# Patient Record
Sex: MALE | Race: WHITE | Employment: UNEMPLOYED | URBAN - METROPOLITAN AREA
[De-identification: names, ages, dates, MRNs, and addresses within clinical notes are randomized per-mention and may not be internally consistent; named-entity substitution may affect disease eponyms.]

---

## 2017-06-25 ENCOUNTER — APPOINTMENT (OUTPATIENT)
Dept: GENERAL RADIOLOGY | Facility: HOSPITAL | Age: 47
DRG: 377 | End: 2017-06-25
Attending: EMERGENCY MEDICINE
Payer: MEDICAID

## 2017-06-25 ENCOUNTER — HOSPITAL ENCOUNTER (INPATIENT)
Facility: HOSPITAL | Age: 47
LOS: 3 days | Discharge: HOME | DRG: 377 | End: 2017-06-28
Attending: EMERGENCY MEDICINE | Admitting: EMERGENCY MEDICINE
Payer: MEDICAID

## 2017-06-25 DIAGNOSIS — K92.2 UPPER GI BLEED: Primary | ICD-10-CM

## 2017-06-25 DIAGNOSIS — K29.00 ACUTE GASTRITIS WITHOUT HEMORRHAGE, UNSPECIFIED GASTRITIS TYPE: ICD-10-CM

## 2017-06-25 DIAGNOSIS — E10.10 TYPE 1 DIABETES MELLITUS WITH KETOACIDOSIS WITHOUT COMA (HCC): ICD-10-CM

## 2017-06-25 DIAGNOSIS — K22.10 EROSIVE ESOPHAGITIS: ICD-10-CM

## 2017-06-25 LAB
ALBUMIN SERPL BCP-MCNC: 4.9 G/DL (ref 3.5–4.8)
ALP SERPL-CCNC: 78 U/L (ref 32–100)
ALT SERPL-CCNC: 40 U/L (ref 17–63)
ANION GAP SERPL CALC-SCNC: 10 MMOL/L (ref 0–18)
ANION GAP SERPL CALC-SCNC: 31 MMOL/L (ref 0–18)
ANTIBODY SCREEN: NEGATIVE
APTT PPP: 25.8 SECONDS (ref 23.2–35.3)
AST SERPL-CCNC: 24 U/L (ref 15–41)
BASE EXCESS BLD CALC-SCNC: -13.8 MMOL/L (ref ?–2)
BASOPHILS # BLD: 0 K/UL (ref 0–0.2)
BASOPHILS NFR BLD: 0 %
BILIRUB DIRECT SERPL-MCNC: 0.2 MG/DL (ref 0–0.2)
BILIRUB SERPL-MCNC: 2 MG/DL (ref 0.3–1.2)
BUN SERPL-MCNC: 24 MG/DL (ref 8–20)
BUN SERPL-MCNC: 34 MG/DL (ref 8–20)
BUN/CREAT SERPL: 16 (ref 10–20)
BUN/CREAT SERPL: 20.2 (ref 10–20)
CALCIUM SERPL-MCNC: 10.6 MG/DL (ref 8.5–10.5)
CALCIUM SERPL-MCNC: 9.7 MG/DL (ref 8.5–10.5)
CHLORIDE SERPL-SCNC: 111 MMOL/L (ref 95–110)
CHLORIDE SERPL-SCNC: 94 MMOL/L (ref 95–110)
CO2 SERPL-SCNC: 15 MMOL/L (ref 22–32)
CO2 SERPL-SCNC: 24 MMOL/L (ref 22–32)
CREAT SERPL-MCNC: 1.19 MG/DL (ref 0.5–1.5)
CREAT SERPL-MCNC: 2.13 MG/DL (ref 0.5–1.5)
EOSINOPHIL # BLD: 0 K/UL (ref 0–0.7)
EOSINOPHIL NFR BLD: 0 %
ERYTHROCYTE [DISTWIDTH] IN BLOOD BY AUTOMATED COUNT: 13.1 % (ref 11–15)
ERYTHROCYTE [DISTWIDTH] IN BLOOD BY AUTOMATED COUNT: 13.3 % (ref 11–15)
GLUCOSE BLDC GLUCOMTR-MCNC: 107 MG/DL (ref 70–99)
GLUCOSE BLDC GLUCOMTR-MCNC: 116 MG/DL (ref 70–99)
GLUCOSE BLDC GLUCOMTR-MCNC: 117 MG/DL (ref 70–99)
GLUCOSE BLDC GLUCOMTR-MCNC: 126 MG/DL (ref 70–99)
GLUCOSE BLDC GLUCOMTR-MCNC: 143 MG/DL (ref 70–99)
GLUCOSE BLDC GLUCOMTR-MCNC: 145 MG/DL (ref 70–99)
GLUCOSE BLDC GLUCOMTR-MCNC: 180 MG/DL (ref 70–99)
GLUCOSE BLDC GLUCOMTR-MCNC: 213 MG/DL (ref 70–99)
GLUCOSE BLDC GLUCOMTR-MCNC: 235 MG/DL (ref 70–99)
GLUCOSE BLDC GLUCOMTR-MCNC: 237 MG/DL (ref 70–99)
GLUCOSE BLDC GLUCOMTR-MCNC: 242 MG/DL (ref 70–99)
GLUCOSE BLDC GLUCOMTR-MCNC: 247 MG/DL (ref 70–99)
GLUCOSE BLDC GLUCOMTR-MCNC: 309 MG/DL (ref 70–99)
GLUCOSE BLDC GLUCOMTR-MCNC: 371 MG/DL (ref 70–99)
GLUCOSE BLDC GLUCOMTR-MCNC: 443 MG/DL (ref 70–99)
GLUCOSE BLDC GLUCOMTR-MCNC: 80 MG/DL (ref 70–99)
GLUCOSE BLDC GLUCOMTR-MCNC: 87 MG/DL (ref 70–99)
GLUCOSE BLDC GLUCOMTR-MCNC: 98 MG/DL (ref 70–99)
GLUCOSE BLDC GLUCOMTR-MCNC: 99 MG/DL (ref 70–99)
GLUCOSE BLDC GLUCOMTR-MCNC: >500 MG/DL (ref 70–99)
GLUCOSE SERPL-MCNC: 145 MG/DL (ref 70–99)
GLUCOSE SERPL-MCNC: 554 MG/DL (ref 70–99)
HBA1C MFR BLD: 9.9 % (ref 4–6)
HCO3 BLDA-SCNC: 12.5 MEQ/L (ref 21–27)
HCT VFR BLD AUTO: 46.3 % (ref 41–52)
HCT VFR BLD AUTO: 49 % (ref 41–52)
HGB BLD-MCNC: 15.2 G/DL (ref 13.5–17.5)
HGB BLD-MCNC: 15.8 G/DL (ref 13.5–17.5)
INR BLD: 1 (ref 0.9–1.2)
LIPASE SERPL-CCNC: 12 U/L (ref 22–51)
LYMPHOCYTES # BLD: 1.2 K/UL (ref 1–4)
LYMPHOCYTES NFR BLD: 7 %
MCH RBC QN AUTO: 31.8 PG (ref 27–32)
MCH RBC QN AUTO: 32.5 PG (ref 27–32)
MCHC RBC AUTO-ENTMCNC: 32.3 G/DL (ref 32–37)
MCHC RBC AUTO-ENTMCNC: 32.8 G/DL (ref 32–37)
MCV RBC AUTO: 100.5 FL (ref 80–100)
MCV RBC AUTO: 97 FL (ref 80–100)
MONOCYTES # BLD: 1 K/UL (ref 0–1)
MONOCYTES NFR BLD: 5 %
MRSA DNA SPEC QL NAA+PROBE: NEGATIVE
NEUTROPHILS # BLD AUTO: 16.4 K/UL (ref 1.8–7.7)
NEUTROPHILS NFR BLD: 88 %
O2 CT BLD-SCNC: 18.6 VOL% (ref 15–23)
OSMOLALITY UR CALC.SUM OF ELEC: 307 MOSM/KG (ref 275–295)
OSMOLALITY UR CALC.SUM OF ELEC: 323 MOSM/KG (ref 275–295)
PCO2 BLDA: 31 MM HG (ref 35–45)
PH BLDA: 7.23 [PH] (ref 7.35–7.45)
PHOSPHATE SERPL-MCNC: 1.3 MG/DL (ref 2.4–4.7)
PLATELET # BLD AUTO: 239 K/UL (ref 140–400)
PLATELET # BLD AUTO: 250 K/UL (ref 140–400)
PMV BLD AUTO: 10.4 FL (ref 7.4–10.3)
PMV BLD AUTO: 9.7 FL (ref 7.4–10.3)
PO2 BLDA: 99 MM HG (ref 80–100)
PO2 SATN ADJ TO 0.5 BLD: 32 MMHG (ref 24–28)
POTASSIUM SERPL-SCNC: 3.9 MMOL/L (ref 3.3–5.1)
POTASSIUM SERPL-SCNC: 4.8 MMOL/L (ref 3.3–5.1)
PROT SERPL-MCNC: 8.4 G/DL (ref 5.9–8.4)
PROTHROMBIN TIME: 12.4 SECONDS (ref 11.8–14.5)
PUNCTURE CHARGE: YES
RBC # BLD AUTO: 4.77 M/UL (ref 4.5–5.9)
RBC # BLD AUTO: 4.88 M/UL (ref 4.5–5.9)
RH BLOOD TYPE: POSITIVE
SAO2 % BLDA: 96.3 % (ref 94–100)
SODIUM SERPL-SCNC: 140 MMOL/L (ref 136–144)
SODIUM SERPL-SCNC: 145 MMOL/L (ref 136–144)
WBC # BLD AUTO: 18.6 K/UL (ref 4–11)
WBC # BLD AUTO: 23.9 K/UL (ref 4–11)

## 2017-06-25 PROCEDURE — 74020 XR ABDOMEN, OBSTRUCTIVE SERIES (CPT=74020): CPT | Performed by: EMERGENCY MEDICINE

## 2017-06-25 PROCEDURE — 99223 1ST HOSP IP/OBS HIGH 75: CPT | Performed by: INTERNAL MEDICINE

## 2017-06-25 PROCEDURE — 99291 CRITICAL CARE FIRST HOUR: CPT | Performed by: INTERNAL MEDICINE

## 2017-06-25 RX ORDER — ONDANSETRON 2 MG/ML
INJECTION INTRAMUSCULAR; INTRAVENOUS
Status: COMPLETED
Start: 2017-06-25 | End: 2017-06-25

## 2017-06-25 RX ORDER — ONDANSETRON 2 MG/ML
4 INJECTION INTRAMUSCULAR; INTRAVENOUS EVERY 6 HOURS PRN
Status: DISCONTINUED | OUTPATIENT
Start: 2017-06-25 | End: 2017-06-28

## 2017-06-25 RX ORDER — SODIUM CHLORIDE 9 MG/ML
INJECTION, SOLUTION INTRAVENOUS CONTINUOUS
Status: DISCONTINUED | OUTPATIENT
Start: 2017-06-25 | End: 2017-06-27

## 2017-06-25 RX ORDER — FAMOTIDINE 20 MG/1
20 TABLET ORAL 2 TIMES DAILY PRN
Status: ON HOLD | COMMUNITY
End: 2017-06-28

## 2017-06-25 RX ORDER — LIDOCAINE HYDROCHLORIDE 20 MG/ML
20 JELLY TOPICAL ONCE
Status: COMPLETED | OUTPATIENT
Start: 2017-06-25 | End: 2017-06-25

## 2017-06-25 RX ORDER — DEXTROSE AND SODIUM CHLORIDE 5; .9 G/100ML; G/100ML
INJECTION, SOLUTION INTRAVENOUS
Status: COMPLETED
Start: 2017-06-25 | End: 2017-06-25

## 2017-06-25 RX ORDER — INSULIN LISPRO 100 [IU]/ML
INJECTION, SOLUTION INTRAVENOUS; SUBCUTANEOUS
Status: ON HOLD | COMMUNITY
End: 2021-08-18

## 2017-06-25 NOTE — CONSULTS
[facility]    Report of Endocrinology Consultation  [facility]    Remigio Whitt Patient Status:  Inpatient    1970 MRN [id number]   Location [facility] 2W/SW Attending Fran Vicente MD   Hosp Day # 0 PCP No CLAUDE, EOMI,   Neck: Supple. Lungs: Clear bilaterally. Cardiac: Regular rate and rhythm. Abdomen: Bowel sounds present, normoactive. Nontender. Extremities:  No lower extremity edema noted, DP +2 b/l  Skin: Normal texture and turgor.   Lymphatic:  No

## 2017-06-25 NOTE — CONSULTS
[facility] AND CLINICS  Report of Consultation    Thai Workman Patient Status:  Inpatient    1970 MRN [id number]   Location [facility] 2W/ Attending Marvin Waller MD   Hosp Day # 0 PCP None Pcp     Reason for Consultation:  Hematemesis (36.7 °C), temperature source Temporal, resp. rate 19, height 5' 11\" (1.803 m), weight 160 lb (72.6 kg), SpO2 96 %. General: Appears alert, oriented x3 and in no acute distress. HEENT: Normal. No neck vein distention. Thyroid not enlarged.   No lymphad Tanner  6/25/2017  1:03 PM

## 2017-06-25 NOTE — ED INITIAL ASSESSMENT (HPI)
Amy Fernando arrives to ED via [facility]NES c/o mid abd pain and n/v x24 hrs. Coffee ground emesis.

## 2017-06-25 NOTE — ED NOTES
Pt is back from x-ray via cart, pt is still c/o abd pain and nausea. Pt is awake and alert, states he feels a little better.

## 2017-06-25 NOTE — ED PROVIDER NOTES
Patient Seen in: [facility] AND [facility] Emergency Department    History   Patient presents with:  GI Bleeding (gastrointestinal)    Stated Complaint: vomiting blood    HPI    40year old male with a past medical history of insulin-dependent diabetes who prese well-developed and well-nourished. He appears distressed (The patient is sitting up, leaning over an emesis basin, intermittently dry heaving and spitting up coffee-ground emesis.). HENT:   Head: Normocephalic and atraumatic.    Eyes: Conjunctivae and EOM 12.5 (*)     Blood Gas Base Excess -13.8 (*)     Blood Gas P-50 32 (*)     All other components within normal limits   POCT GLUCOSE - Abnormal; Notable for the following:     POC Glucose  >500 (*)     All other components within normal limits   CBC W/ DIFF Course  ------------------------------------------------------------  [facility]     Pulse Ox: 98%, Normal, RA    Cardiac Monitor: Pulse Readings from Last 1 Encounters:  06/25/17 : 104  , sinus, normal for rhythm, abnormal for rate    Medications   Insulin Regul

## 2017-06-25 NOTE — H&P
[facility]    History & Physical    Date:  6/25/2017   Date of Admission:  6/25/2017      Chief Complaint:   Thai Melena is a(n) 40year old male with hematemesis and hyperglycemia.     HPI:   The patient has a history of type 1 diabetes examination is unremarkable with pupils equal round and reactive to light and accommodation. Neck without adenopathy, thyromegaly, JVD nor bruit. Lungs clear to auscultation and percussion. Cardiac regular rate and rhythm no murmur.    Abdomen minimal

## 2017-06-25 NOTE — ED NOTES
Huy Garza presents to the ER c/o vomiting x 24 hours. Pt is alert oriented from home. Copious dark, brown vomitus present. Pt states he's unsure how long he's been vomiting blood. IV 20# to left ac started by EMS. IV 14# started here in the ER.

## 2017-06-26 LAB
ALBUMIN SERPL BCP-MCNC: 3.2 G/DL (ref 3.5–4.8)
ALBUMIN/GLOB SERPL: 1.4 {RATIO} (ref 1–2)
ALP SERPL-CCNC: 46 U/L (ref 32–100)
ALT SERPL-CCNC: 22 U/L (ref 17–63)
ANION GAP SERPL CALC-SCNC: 5 MMOL/L (ref 0–18)
AST SERPL-CCNC: 17 U/L (ref 15–41)
BASOPHILS # BLD: 0 K/UL (ref 0–0.2)
BASOPHILS NFR BLD: 0 %
BILIRUB SERPL-MCNC: 1.4 MG/DL (ref 0.3–1.2)
BUN SERPL-MCNC: 11 MG/DL (ref 8–20)
BUN/CREAT SERPL: 14.7 (ref 10–20)
CALCIUM SERPL-MCNC: 8.1 MG/DL (ref 8.5–10.5)
CHLORIDE SERPL-SCNC: 107 MMOL/L (ref 95–110)
CO2 SERPL-SCNC: 26 MMOL/L (ref 22–32)
CREAT SERPL-MCNC: 0.75 MG/DL (ref 0.5–1.5)
EOSINOPHIL # BLD: 0 K/UL (ref 0–0.7)
EOSINOPHIL NFR BLD: 0 %
ERYTHROCYTE [DISTWIDTH] IN BLOOD BY AUTOMATED COUNT: 12.9 % (ref 11–15)
GLOBULIN PLAS-MCNC: 2.3 G/DL (ref 2.5–3.7)
GLUCOSE BLDC GLUCOMTR-MCNC: 107 MG/DL (ref 70–99)
GLUCOSE BLDC GLUCOMTR-MCNC: 108 MG/DL (ref 70–99)
GLUCOSE BLDC GLUCOMTR-MCNC: 123 MG/DL (ref 70–99)
GLUCOSE BLDC GLUCOMTR-MCNC: 136 MG/DL (ref 70–99)
GLUCOSE BLDC GLUCOMTR-MCNC: 141 MG/DL (ref 70–99)
GLUCOSE BLDC GLUCOMTR-MCNC: 149 MG/DL (ref 70–99)
GLUCOSE BLDC GLUCOMTR-MCNC: 150 MG/DL (ref 70–99)
GLUCOSE BLDC GLUCOMTR-MCNC: 150 MG/DL (ref 70–99)
GLUCOSE BLDC GLUCOMTR-MCNC: 152 MG/DL (ref 70–99)
GLUCOSE BLDC GLUCOMTR-MCNC: 153 MG/DL (ref 70–99)
GLUCOSE BLDC GLUCOMTR-MCNC: 157 MG/DL (ref 70–99)
GLUCOSE BLDC GLUCOMTR-MCNC: 158 MG/DL (ref 70–99)
GLUCOSE BLDC GLUCOMTR-MCNC: 182 MG/DL (ref 70–99)
GLUCOSE BLDC GLUCOMTR-MCNC: 205 MG/DL (ref 70–99)
GLUCOSE BLDC GLUCOMTR-MCNC: 230 MG/DL (ref 70–99)
GLUCOSE BLDC GLUCOMTR-MCNC: 94 MG/DL (ref 70–99)
GLUCOSE SERPL-MCNC: 172 MG/DL (ref 70–99)
HCT VFR BLD AUTO: 38.3 % (ref 41–52)
HGB BLD-MCNC: 12.8 G/DL (ref 13.5–17.5)
LYMPHOCYTES # BLD: 1.8 K/UL (ref 1–4)
LYMPHOCYTES NFR BLD: 10 %
MAGNESIUM SERPL-MCNC: 1.2 MG/DL (ref 1.8–2.5)
MCH RBC QN AUTO: 32.6 PG (ref 27–32)
MCHC RBC AUTO-ENTMCNC: 33.4 G/DL (ref 32–37)
MCV RBC AUTO: 97.4 FL (ref 80–100)
MONOCYTES # BLD: 1.5 K/UL (ref 0–1)
MONOCYTES NFR BLD: 8 %
NEUTROPHILS # BLD AUTO: 15.3 K/UL (ref 1.8–7.7)
NEUTROPHILS NFR BLD: 82 %
OSMOLALITY UR CALC.SUM OF ELEC: 289 MOSM/KG (ref 275–295)
PHOSPHATE SERPL-MCNC: 2 MG/DL (ref 2.4–4.7)
PLATELET # BLD AUTO: 182 K/UL (ref 140–400)
PMV BLD AUTO: 9.8 FL (ref 7.4–10.3)
POTASSIUM SERPL-SCNC: 3.2 MMOL/L (ref 3.3–5.1)
POTASSIUM SERPL-SCNC: 3.8 MMOL/L (ref 3.3–5.1)
PROT SERPL-MCNC: 5.5 G/DL (ref 5.9–8.4)
RBC # BLD AUTO: 3.93 M/UL (ref 4.5–5.9)
SODIUM SERPL-SCNC: 138 MMOL/L (ref 136–144)
WBC # BLD AUTO: 18.7 K/UL (ref 4–11)

## 2017-06-26 PROCEDURE — 99233 SBSQ HOSP IP/OBS HIGH 50: CPT | Performed by: INTERNAL MEDICINE

## 2017-06-26 PROCEDURE — 99232 SBSQ HOSP IP/OBS MODERATE 35: CPT | Performed by: INTERNAL MEDICINE

## 2017-06-26 RX ORDER — DEXTROSE AND SODIUM CHLORIDE 5; .9 G/100ML; G/100ML
INJECTION, SOLUTION INTRAVENOUS CONTINUOUS
Status: DISCONTINUED | OUTPATIENT
Start: 2017-06-25 | End: 2017-06-27

## 2017-06-26 RX ORDER — POTASSIUM CHLORIDE 20 MEQ/1
40 TABLET, EXTENDED RELEASE ORAL EVERY 4 HOURS
Status: COMPLETED | OUTPATIENT
Start: 2017-06-26 | End: 2017-06-26

## 2017-06-26 RX ORDER — DEXTROSE AND SODIUM CHLORIDE 5; .9 G/100ML; G/100ML
INJECTION, SOLUTION INTRAVENOUS CONTINUOUS
Status: DISCONTINUED | OUTPATIENT
Start: 2017-06-26 | End: 2017-06-26

## 2017-06-26 RX ORDER — 0.9 % SODIUM CHLORIDE 0.9 %
VIAL (ML) INJECTION
Status: COMPLETED
Start: 2017-06-26 | End: 2017-06-26

## 2017-06-26 RX ORDER — ACETAMINOPHEN 325 MG/1
650 TABLET ORAL EVERY 6 HOURS PRN
Status: DISCONTINUED | OUTPATIENT
Start: 2017-06-26 | End: 2017-06-28

## 2017-06-26 RX ORDER — MAGNESIUM OXIDE 400 MG (241.3 MG MAGNESIUM) TABLET
800 TABLET ONCE
Status: COMPLETED | OUTPATIENT
Start: 2017-06-26 | End: 2017-06-26

## 2017-06-26 RX ORDER — DEXTROSE MONOHYDRATE 25 G/50ML
50 INJECTION, SOLUTION INTRAVENOUS AS NEEDED
Status: DISCONTINUED | OUTPATIENT
Start: 2017-06-26 | End: 2017-06-28

## 2017-06-26 NOTE — DIABETES ED
[facility]   Diabetes Education Note    Zacarias Huerta  Patient Status Inpatient    1970  Location [facility] 2W/SW  Attending Keshia Montano, [address] Day # 1  PCP  None Pcp      Reason for Visit:  Diabetic Ketoacidosis practices and encouraged out-patient diabetes education.      Gloria Eugene  6/26/2017  9:16 AM

## 2017-06-26 NOTE — PROGRESS NOTES
[facility] - [facility]    Progress Note      Assessment and Plan:   1.   Hematemesis–differential diagnosis includes Samira-Lugo tear versus erosive gastritis and esophagitis versus ulcer versus other pathology.     Recommendations: PPI, NG tube, Zofr MD  Medical Director, [phone] [phone]  Medical Director, [facility]  Pager: [phone]

## 2017-06-26 NOTE — PLAN OF CARE
Diabetes/Glucose Control    • Glucose maintained within prescribed range Progressing    Insulin gtt infusing. Added d5 .9 per MD to help with sugars.     GASTROINTESTINAL - ADULT    • Minimal or absence of nausea and vomiting Progressing    No vomiting this

## 2017-06-26 NOTE — PROGRESS NOTES
[facility]    Endocrine Progress Note  [facility]    Jatinder Chvaes Patient Status:  Inpatient    1970 MRN [id number]   Location [facility] 2W/SW Attending Ellen Matri MD   Hosp Day # 1 PCP None Pcp       As ALB 3.2 (L) 06/26/2017   ALKPHO 46 06/26/2017   BILT 1.4 (H) 06/26/2017   TP 5.5 (L) 06/26/2017   AST 17 06/26/2017   ALT 22 06/26/2017   PTT 25.8 06/25/2017   INR 1.0 06/25/2017   LIP 12 (L) 06/25/2017   MG 1.2 (L) 06/26/2017   PHOS 2.0 (L) 06/26/2017

## 2017-06-26 NOTE — PROGRESS NOTES
[facility] AND CLINICS  Progress Note    Elvin Sutton Patient Status:  Inpatient    1970 MRN [id number]   Location [facility] 2W/SW Attending Peri Beckham MD   Hosp Day # 1 PCP None Pcp     Subjective:  Elvin Sutton is a(n) 40year old mal

## 2017-06-26 NOTE — PLAN OF CARE
Diabetes/Glucose Control    • Glucose maintained within prescribed range Progressing        Hourly accuchecks, sugars controlled. Insulin drip maintained. Plan is to transition off the drip tonight. PT on 1800 ADA diet.  NPO tomorrow for EGD  GASTROINTESTI

## 2017-06-27 ENCOUNTER — SURGERY (OUTPATIENT)
Age: 47
End: 2017-06-27

## 2017-06-27 ENCOUNTER — ANESTHESIA (OUTPATIENT)
Dept: ENDOSCOPY | Facility: HOSPITAL | Age: 47
DRG: 377 | End: 2017-06-27
Payer: MEDICAID

## 2017-06-27 ENCOUNTER — ANESTHESIA EVENT (OUTPATIENT)
Dept: ENDOSCOPY | Facility: HOSPITAL | Age: 47
DRG: 377 | End: 2017-06-27
Payer: MEDICAID

## 2017-06-27 LAB
GLUCOSE BLDC GLUCOMTR-MCNC: 185 MG/DL (ref 70–99)
GLUCOSE BLDC GLUCOMTR-MCNC: 219 MG/DL (ref 70–99)
GLUCOSE BLDC GLUCOMTR-MCNC: 328 MG/DL (ref 70–99)
GLUCOSE BLDC GLUCOMTR-MCNC: 465 MG/DL (ref 70–99)
GLUCOSE BLDC GLUCOMTR-MCNC: 53 MG/DL (ref 70–99)
GLUCOSE BLDC GLUCOMTR-MCNC: 58 MG/DL (ref 70–99)
MAGNESIUM SERPL-MCNC: 1.5 MG/DL (ref 1.8–2.5)
POTASSIUM SERPL-SCNC: 3.3 MMOL/L (ref 3.3–5.1)
POTASSIUM SERPL-SCNC: 5.5 MMOL/L (ref 3.3–5.1)

## 2017-06-27 PROCEDURE — 0DB68ZX EXCISION OF STOMACH, VIA NATURAL OR ARTIFICIAL OPENING ENDOSCOPIC, DIAGNOSTIC: ICD-10-PCS | Performed by: INTERNAL MEDICINE

## 2017-06-27 PROCEDURE — 43239 EGD BIOPSY SINGLE/MULTIPLE: CPT | Performed by: INTERNAL MEDICINE

## 2017-06-27 PROCEDURE — 0DB48ZX EXCISION OF ESOPHAGOGASTRIC JUNCTION, VIA NATURAL OR ARTIFICIAL OPENING ENDOSCOPIC, DIAGNOSTIC: ICD-10-PCS | Performed by: INTERNAL MEDICINE

## 2017-06-27 PROCEDURE — 99233 SBSQ HOSP IP/OBS HIGH 50: CPT | Performed by: HOSPITALIST

## 2017-06-27 PROCEDURE — 99233 SBSQ HOSP IP/OBS HIGH 50: CPT | Performed by: INTERNAL MEDICINE

## 2017-06-27 RX ORDER — NALOXONE HYDROCHLORIDE 0.4 MG/ML
80 INJECTION, SOLUTION INTRAMUSCULAR; INTRAVENOUS; SUBCUTANEOUS AS NEEDED
Status: ACTIVE | OUTPATIENT
Start: 2017-06-27 | End: 2017-06-27

## 2017-06-27 RX ORDER — LIDOCAINE HYDROCHLORIDE 10 MG/ML
INJECTION, SOLUTION EPIDURAL; INFILTRATION; INTRACAUDAL; PERINEURAL AS NEEDED
Status: DISCONTINUED | OUTPATIENT
Start: 2017-06-27 | End: 2017-06-27 | Stop reason: SURG

## 2017-06-27 RX ORDER — 0.9 % SODIUM CHLORIDE 0.9 %
VIAL (ML) INJECTION
Status: COMPLETED
Start: 2017-06-27 | End: 2017-06-27

## 2017-06-27 RX ORDER — POTASSIUM CHLORIDE 20 MEQ/1
40 TABLET, EXTENDED RELEASE ORAL EVERY 4 HOURS
Status: COMPLETED | OUTPATIENT
Start: 2017-06-27 | End: 2017-06-27

## 2017-06-27 RX ORDER — SODIUM CHLORIDE, SODIUM LACTATE, POTASSIUM CHLORIDE, CALCIUM CHLORIDE 600; 310; 30; 20 MG/100ML; MG/100ML; MG/100ML; MG/100ML
INJECTION, SOLUTION INTRAVENOUS CONTINUOUS
Status: DISCONTINUED | OUTPATIENT
Start: 2017-06-27 | End: 2017-06-28

## 2017-06-27 RX ORDER — MAGNESIUM OXIDE 400 MG (241.3 MG MAGNESIUM) TABLET
800 TABLET ONCE
Status: COMPLETED | OUTPATIENT
Start: 2017-06-27 | End: 2017-06-27

## 2017-06-27 RX ORDER — SODIUM CHLORIDE, SODIUM LACTATE, POTASSIUM CHLORIDE, CALCIUM CHLORIDE 600; 310; 30; 20 MG/100ML; MG/100ML; MG/100ML; MG/100ML
INJECTION, SOLUTION INTRAVENOUS CONTINUOUS PRN
Status: DISCONTINUED | OUTPATIENT
Start: 2017-06-27 | End: 2017-06-27 | Stop reason: SURG

## 2017-06-27 RX ADMIN — LIDOCAINE HYDROCHLORIDE 50 MG: 10 INJECTION, SOLUTION EPIDURAL; INFILTRATION; INTRACAUDAL; PERINEURAL at 10:34:00

## 2017-06-27 RX ADMIN — SODIUM CHLORIDE, SODIUM LACTATE, POTASSIUM CHLORIDE, CALCIUM CHLORIDE: 600; 310; 30; 20 INJECTION, SOLUTION INTRAVENOUS at 10:52:00

## 2017-06-27 RX ADMIN — SODIUM CHLORIDE, SODIUM LACTATE, POTASSIUM CHLORIDE, CALCIUM CHLORIDE: 600; 310; 30; 20 INJECTION, SOLUTION INTRAVENOUS at 10:32:00

## 2017-06-27 NOTE — H&P
The above referenced H&P was reviewed by Olinda Brunson MD on 6/27/2017, the patient was examined and no significant changes have occurred in the patient's condition since the H&P was performed.  I discussed with the patient and/or legal representative the

## 2017-06-27 NOTE — PLAN OF CARE
Diabetes/Glucose Control    • Glucose maintained within prescribed range Not Progressing          GASTROINTESTINAL - ADULT    • Minimal or absence of nausea and vomiting Progressing        Patient/Family Goals    • Patient/Family Long Term Goal Progressing

## 2017-06-27 NOTE — PROGRESS NOTES
[facility]  Hospitalist Progress  Note     Elvin Sutton Patient Status:  Inpatient      40year old [facility] [id number]   Location 545/545-A Attending Zeinab Moreno MD   Hosp Day # 2 PCP None Pcp     ASSESSMENT/PLAN    Diabetic keto 138   --    --    K  4.8  3.9  3.2*  3.8  3.3   CL  94*  111*  107   --    --    CO2  15*  24  26   --    --    ALKPHO  78   --   46   --    --    AST  24   --   17   --    --    ALT  40   --   22   --    --    BILT  2.0*   --   1.4*   --    --    TP  8.4

## 2017-06-27 NOTE — PROGRESS NOTES
[facility]    Progress Note      Assessment and Plan:   1. Hematemesis– the upper endoscopy showed erosive esophagitis and gastritis.     Recommendations: Okay to floor transfer. CBC in the morning. As per gastroenterology. PPI.     2.

## 2017-06-27 NOTE — PLAN OF CARE
Patient received as transfer from [place] care unit. Patient is alert and oriented x 4. Patient resting comfortable in the bed. Saline lock present and patent. Vital signs taken and stable. No respiratory distress or shortness of breath noted.  Call light

## 2017-06-27 NOTE — PROGRESS NOTES
[facility]    Endocrine Progress Note  [facility]    Veronica Navain Patient Status:  Inpatient    1970 MRN [id number]   Location [facility] 5SW/SE Attending Mj Rios MD   Hosp Day # 2 PCP None Pcp       Ass 06/26/2017   CO2 26 06/26/2017    (H) 06/26/2017   CA 8.1 (L) 06/26/2017   ALB 3.2 (L) 06/26/2017   ALKPHO 46 06/26/2017   BILT 1.4 (H) 06/26/2017   TP 5.5 (L) 06/26/2017   AST 17 06/26/2017   ALT 22 06/26/2017   PTT 25.8 06/25/2017   INR 1.0 06/25/

## 2017-06-27 NOTE — OPERATIVE REPORT
[facility] Endoscopy Report        Preoperative Diagnosis:  - hematemesis     Postoperative Diagnosis:  - Erosive esophagitis  - Gastritis        Procedure:   Esophagogastroduodenoscopy         Surgeon:  Hoang Miranda M.D.     Anesthesia:

## 2017-06-27 NOTE — ANESTHESIA PREPROCEDURE EVALUATION
Anesthesia PreOp Note    HPI:     Kt Tejada is a 40year old male who presents for preoperative consultation requested by: Mavis Ruiz MD    Date of Surgery: 6/25/2017 - 6/27/2017    Procedure(s):  ESOPHAGOGASTRODUODENOSCOPY (EGD)  Indication: H 0.9%  NaCl infusion  Intravenous Continuous Sukhi Monge MD Stopped at 06/25/17 2245   ondansetron HCl (ZOFRAN) injection 4 mg 4 mg Intravenous Q6H PRN Sukhi Monge MD 4 mg at 06/26/17 7010   Pantoprazole Sodium (PROTONIX) 40 mg in Sodium Chlorid Temp: 99 °F (37.2 °C)  98 °F (36.7 °C)    TempSrc: Temporal  Temporal    SpO2:       Weight:       Height:            Anesthesia ROS/Med Hx and Physical Exam     Patient summary reviewed and Nursing notes reviewed    Airway   Mallampati: II  TM distance: <

## 2017-06-27 NOTE — ANESTHESIA POSTPROCEDURE EVALUATION
Patient: Priya Colon    Procedure Summary     Date:  06/27/17 Room / Location:  [address] [place] [address] ENDOSCOPY 05 / [address] [place] [address] ENDOSCOPY    Anesthesia Start:  8064 Anesthesia Stop:      Procedure:  ESOPHAGOGASTRODUODENOSCOPY (EGD) (N/A ) Diagnosis:  (Gastritis, Erosive esophag

## 2017-06-28 VITALS
HEART RATE: 88 BPM | RESPIRATION RATE: 16 BRPM | TEMPERATURE: 99 F | BODY MASS INDEX: 22.4 KG/M2 | WEIGHT: 160 LBS | DIASTOLIC BLOOD PRESSURE: 96 MMHG | HEIGHT: 71 IN | SYSTOLIC BLOOD PRESSURE: 150 MMHG | OXYGEN SATURATION: 97 %

## 2017-06-28 LAB
ANION GAP SERPL CALC-SCNC: 12 MMOL/L (ref 0–18)
BASOPHILS # BLD: 0 K/UL (ref 0–0.2)
BASOPHILS NFR BLD: 1 %
BUN SERPL-MCNC: 11 MG/DL (ref 8–20)
BUN/CREAT SERPL: 13.9 (ref 10–20)
CALCIUM SERPL-MCNC: 9.7 MG/DL (ref 8.5–10.5)
CHLORIDE SERPL-SCNC: 93 MMOL/L (ref 95–110)
CO2 SERPL-SCNC: 29 MMOL/L (ref 22–32)
CREAT SERPL-MCNC: 0.79 MG/DL (ref 0.5–1.5)
EOSINOPHIL # BLD: 0.2 K/UL (ref 0–0.7)
EOSINOPHIL NFR BLD: 3 %
ERYTHROCYTE [DISTWIDTH] IN BLOOD BY AUTOMATED COUNT: 12.5 % (ref 11–15)
GLUCOSE BLDC GLUCOMTR-MCNC: 283 MG/DL (ref 70–99)
GLUCOSE BLDC GLUCOMTR-MCNC: 397 MG/DL (ref 70–99)
GLUCOSE BLDC GLUCOMTR-MCNC: 407 MG/DL (ref 70–99)
GLUCOSE BLDC GLUCOMTR-MCNC: 470 MG/DL (ref 70–99)
GLUCOSE BLDC GLUCOMTR-MCNC: 98 MG/DL (ref 70–99)
GLUCOSE SERPL-MCNC: 192 MG/DL (ref 70–99)
HCT VFR BLD AUTO: 41.1 % (ref 41–52)
HGB BLD-MCNC: 14.1 G/DL (ref 13.5–17.5)
LYMPHOCYTES # BLD: 1.8 K/UL (ref 1–4)
LYMPHOCYTES NFR BLD: 26 %
MAGNESIUM SERPL-MCNC: 1.3 MG/DL (ref 1.8–2.5)
MCH RBC QN AUTO: 32.8 PG (ref 27–32)
MCHC RBC AUTO-ENTMCNC: 34.2 G/DL (ref 32–37)
MCV RBC AUTO: 96 FL (ref 80–100)
MONOCYTES # BLD: 0.9 K/UL (ref 0–1)
MONOCYTES NFR BLD: 13 %
NEUTROPHILS # BLD AUTO: 4 K/UL (ref 1.8–7.7)
NEUTROPHILS NFR BLD: 57 %
OSMOLALITY UR CALC.SUM OF ELEC: 283 MOSM/KG (ref 275–295)
PLATELET # BLD AUTO: 163 K/UL (ref 140–400)
PMV BLD AUTO: 9.8 FL (ref 7.4–10.3)
POTASSIUM SERPL-SCNC: 4.5 MMOL/L (ref 3.3–5.1)
RBC # BLD AUTO: 4.28 M/UL (ref 4.5–5.9)
SODIUM SERPL-SCNC: 134 MMOL/L (ref 136–144)
WBC # BLD AUTO: 7 K/UL (ref 4–11)

## 2017-06-28 PROCEDURE — 99239 HOSP IP/OBS DSCHRG MGMT >30: CPT | Performed by: HOSPITALIST

## 2017-06-28 PROCEDURE — 99232 SBSQ HOSP IP/OBS MODERATE 35: CPT | Performed by: INTERNAL MEDICINE

## 2017-06-28 RX ORDER — PANTOPRAZOLE SODIUM 40 MG/1
40 TABLET, DELAYED RELEASE ORAL
Qty: 30 TABLET | Refills: 0 | Status: SHIPPED | OUTPATIENT
Start: 2017-06-28

## 2017-06-28 NOTE — PLAN OF CARE
Problem: Diabetes/Glucose Control  Goal: Glucose maintained within prescribed range  INTERVENTIONS:  - Monitor Blood Glucose as ordered  - Assess for signs and symptoms of hyperglycemia and hypoglycemia  - Administer ordered medications to maintain glucose appropriate  - Advance diet as tolerated, if ordered  - Obtain nutritional consult as needed  - Evaluate fluid balance   Outcome: Progressing  (1) Patient tolerates low fiber/soft diet well without any nausea or vomiting.  (2) Intake and output is monitore

## 2017-06-28 NOTE — PROGRESS NOTES
[facility] AND CLINICS  Progress Note    Remigio Whitt Patient Status:  Inpatient    1970 MRN [id number]   Location [facility] 5SW/SE Attending Amanda Dodge MD   Hosp Day # 3 PCP None Pcp     Subjective:  Remigio Whitt is a(n) 40year old male

## 2017-06-29 LAB — CLO TEST: NEGATIVE

## 2017-06-29 NOTE — PROGRESS NOTES
At 2005 Dr. Suzy Zaidi notified of rechecked blood glucose of 415. Additional 5 units of Novolog administered per Dr. Angela Church order. Pt refusing to stay on the hospital floor any longer, MD notified that glucose recheck will be impossible.  Per Dr. Suzy Zaidi pt can

## 2017-06-29 NOTE — PROGRESS NOTES
Pt discharged home with family at 2040. PCT walked patient down to the exit door. All belongings with the patient. Discharge packet explained and send home with the patient. All questions answered. IV site removed. Wristband removed.

## 2017-06-29 NOTE — PAYOR COMM NOTE
--------------  DISCHARGE REVIEW    Payor: Nathan Carvajal #:  [id number]  Authorization Number: N/A    Admit date: 6/25/2017  5:33 AM  Discharge Date: 6/28/2017  8:45 PM     Admitting Physician:   Attending Physician:  No att. providers found  Primary history on file.     Smoking status: Current Every Day Smoker                                                   Packs/day: 0.00      Years: 0.00         Types: Cigarettes[VR.2]      Review of Systems    Positive for stated complaint: vomiting blood  Other s has a normal mood and affect.  His behavior is normal.   Nursing note and vitals reviewed.[VR.2]        ED Course[VR.1]     Labs Reviewed   BASIC METABOLIC PANEL (8) - Abnormal; Notable for the following:        Result Value    Glucose 554 (*)     Chloride view results for these tests on the individual orders. TYPE AND SCREEN    Narrative: The following orders were created for panel order TYPE AND SCREEN.   Procedure                               Abnormality         Status                     --------- started. The patient has upper GI bleeding, given Protonix, NG tube in place with approximately 200-250cc coffee ground output with cessation of vomiting.      I spent a total of 30 minutes of critical care time in obtaining history, performing a physical alternatives and risks related to not receiving this procedure. We will proceed with procedure as planned. [PL.1]    Electronically signed by Drake Siemens, MD on 6/27/2017 10:28 AM   Attribution [place]     PL. 1 - Drake Siemens, MD on 6/27/2017 10:28 AM

## 2017-07-05 NOTE — DISCHARGE SUMMARY
[facility] HOSPITALIST  DISCHARGE SUMMARY     Ofelia Barbosa Patient Status:  Inpatient    1970 MRN [id number]   Location [facility] 5SW/SE Attending No att. providers found   [place] Day # 3 PCP None Pcp     DATE OF ADMISSION: 2017  DATE OF and gait normal.   MS: No joint effusions. No peripheral edema. Skin: Skin is warm and dry. No rashes, erythema, diaphoresis. Psych: Normal mood and affect.  Behavior and judgment normal.     DISCHARGE MEDICATIONS     Discharge Medications      START ta

## 2018-06-26 ENCOUNTER — HOSPITAL ENCOUNTER (EMERGENCY)
Facility: HOSPITAL | Age: 48
Discharge: HOME | End: 2018-06-26
Attending: EMERGENCY MEDICINE
Payer: MEDICAID

## 2018-06-26 ENCOUNTER — APPOINTMENT (OUTPATIENT)
Dept: GENERAL RADIOLOGY | Facility: HOSPITAL | Age: 48
End: 2018-06-26
Attending: EMERGENCY MEDICINE
Payer: MEDICAID

## 2018-06-26 VITALS
WEIGHT: 160 LBS | RESPIRATION RATE: 20 BRPM | SYSTOLIC BLOOD PRESSURE: 143 MMHG | BODY MASS INDEX: 22.4 KG/M2 | DIASTOLIC BLOOD PRESSURE: 88 MMHG | HEART RATE: 84 BPM | HEIGHT: 71 IN | OXYGEN SATURATION: 99 %

## 2018-06-26 DIAGNOSIS — S22.41XA CLOSED FRACTURE OF MULTIPLE RIBS OF RIGHT SIDE, INITIAL ENCOUNTER: Primary | ICD-10-CM

## 2018-06-26 PROCEDURE — 72100 X-RAY EXAM L-S SPINE 2/3 VWS: CPT | Performed by: EMERGENCY MEDICINE

## 2018-06-26 PROCEDURE — 71101 X-RAY EXAM UNILAT RIBS/CHEST: CPT | Performed by: EMERGENCY MEDICINE

## 2018-06-26 PROCEDURE — 99283 EMERGENCY DEPT VISIT LOW MDM: CPT

## 2018-06-26 RX ORDER — IBUPROFEN 600 MG/1
600 TABLET ORAL EVERY 8 HOURS PRN
Qty: 30 TABLET | Refills: 0 | Status: SHIPPED | OUTPATIENT
Start: 2018-06-26 | End: 2018-07-03

## 2018-06-26 RX ORDER — CYCLOBENZAPRINE HCL 10 MG
10 TABLET ORAL ONCE
Status: COMPLETED | OUTPATIENT
Start: 2018-06-26 | End: 2018-06-26

## 2018-06-26 RX ORDER — HYDROCODONE BITARTRATE AND ACETAMINOPHEN 5; 325 MG/1; MG/1
1 TABLET ORAL ONCE
Status: COMPLETED | OUTPATIENT
Start: 2018-06-26 | End: 2018-06-26

## 2018-06-26 RX ORDER — IBUPROFEN 600 MG/1
600 TABLET ORAL ONCE
Status: COMPLETED | OUTPATIENT
Start: 2018-06-26 | End: 2018-06-26

## 2018-06-26 RX ORDER — HYDROCODONE BITARTRATE AND ACETAMINOPHEN 5; 325 MG/1; MG/1
1 TABLET ORAL EVERY 4 HOURS PRN
Qty: 10 TABLET | Refills: 0 | Status: SHIPPED | OUTPATIENT
Start: 2018-06-26 | End: 2018-07-03

## 2018-06-26 RX ORDER — CYCLOBENZAPRINE HCL 10 MG
10 TABLET ORAL 3 TIMES DAILY PRN
Qty: 20 TABLET | Refills: 0 | Status: SHIPPED | OUTPATIENT
Start: 2018-06-26 | End: 2018-07-03

## 2018-06-26 NOTE — ED NOTES
Pt c/o rt sided low back pain and flank pain after falling last night. Pt states that he tripped and \"twisted his ankle\", and fell, hitting his rt back and flank on the curb. Pt denies hitting his head, denies loc. Pt has tenderness on palpation.  Pt sunil

## 2018-06-26 NOTE — ED PROVIDER NOTES
Patient Seen in: [facility] AND [facility] Emergency Department    History   Patient presents with:  Back Pain (musculoskeletal)  Hip Pain    Stated Complaint: r hip pain/back pain s/p fall last night    HPI    81-year-old male presents for complaint of back melinda Pulse: 84  Resp: 22  Temp: n/a  Temp src: n/a  SpO2: 97 %  O2 Device: None (Room air)    Current:/88   Pulse 84   Resp 20   Ht 180.3 cm (5' 11\")   Wt 72.6 kg   SpO2 99%   BMI 22.32 kg/m²         Physical Exam   Constitutional: He is oriented to Fco Herrera Xr Lumbar Spine (min 2 Views) (cpt=72100)    Result Date: 6/26/2018  PROCEDURE: XR LUMBAR SPINE (MIN 2 VIEWS) (CPT=72100)  COMPARISON: [facility], CT PF ABD [address]*, 4/28/2005, 16:18. INDICATIONS: Post fall one day ago.  Pain in ri Clinical impression as well as any lab results and radiology findings were discussed with the patient. Patient is advised to follow up with PCP for reevaluation. Return precautions were given.  Patient voices understanding and agreement with the treatment p

## 2018-06-26 NOTE — ED NOTES
Patient given discharge instructions. Awaiting IS from respiratory. Verbalized understanding. Brother picking patient up.

## 2018-06-26 NOTE — ED INITIAL ASSESSMENT (HPI)
Pt c/o right hip pain and back pain after falling last night. Pt states he was throwing away garbage and he twisted his ankle and he fell on his right side. Pt denies any problems with his ankle, denies hitting head, denies LOC.

## 2020-09-19 ENCOUNTER — APPOINTMENT (OUTPATIENT)
Dept: CT IMAGING | Facility: HOSPITAL | Age: 50
DRG: 638 | End: 2020-09-19
Attending: EMERGENCY MEDICINE
Payer: COMMERCIAL

## 2020-09-19 ENCOUNTER — HOSPITAL ENCOUNTER (INPATIENT)
Facility: HOSPITAL | Age: 50
LOS: 3 days | Discharge: HOME | DRG: 638 | End: 2020-09-22
Attending: EMERGENCY MEDICINE | Admitting: HOSPITALIST
Payer: COMMERCIAL

## 2020-09-19 DIAGNOSIS — E10.10 TYPE 1 DIABETES MELLITUS WITH KETOACIDOSIS WITHOUT COMA (HCC): Primary | ICD-10-CM

## 2020-09-19 PROCEDURE — 74176 CT ABD & PELVIS W/O CONTRAST: CPT | Performed by: EMERGENCY MEDICINE

## 2020-09-19 RX ORDER — MORPHINE SULFATE 2 MG/ML
2 INJECTION, SOLUTION INTRAMUSCULAR; INTRAVENOUS EVERY 2 HOUR PRN
Status: DISCONTINUED | OUTPATIENT
Start: 2020-09-19 | End: 2020-09-22

## 2020-09-19 RX ORDER — ONDANSETRON 2 MG/ML
4 INJECTION INTRAMUSCULAR; INTRAVENOUS EVERY 6 HOURS PRN
Status: DISCONTINUED | OUTPATIENT
Start: 2020-09-19 | End: 2020-09-22

## 2020-09-19 RX ORDER — MORPHINE SULFATE 4 MG/ML
4 INJECTION, SOLUTION INTRAMUSCULAR; INTRAVENOUS EVERY 2 HOUR PRN
Status: DISCONTINUED | OUTPATIENT
Start: 2020-09-19 | End: 2020-09-22

## 2020-09-19 RX ORDER — DEXTROSE MONOHYDRATE 25 G/50ML
50 INJECTION, SOLUTION INTRAVENOUS
Status: DISCONTINUED | OUTPATIENT
Start: 2020-09-19 | End: 2020-09-22

## 2020-09-19 RX ORDER — SODIUM CHLORIDE 0.9 % (FLUSH) 0.9 %
3 SYRINGE (ML) INJECTION AS NEEDED
Status: DISCONTINUED | OUTPATIENT
Start: 2020-09-19 | End: 2020-09-22

## 2020-09-19 RX ORDER — DEXTROSE AND SODIUM CHLORIDE 5; .45 G/100ML; G/100ML
75 INJECTION, SOLUTION INTRAVENOUS CONTINUOUS PRN
Status: DISCONTINUED | OUTPATIENT
Start: 2020-09-19 | End: 2020-09-22

## 2020-09-19 RX ORDER — ONDANSETRON 2 MG/ML
4 INJECTION INTRAMUSCULAR; INTRAVENOUS ONCE
Status: COMPLETED | OUTPATIENT
Start: 2020-09-19 | End: 2020-09-19

## 2020-09-19 RX ORDER — FAMOTIDINE 10 MG/ML
20 INJECTION, SOLUTION INTRAVENOUS ONCE
Status: COMPLETED | OUTPATIENT
Start: 2020-09-19 | End: 2020-09-19

## 2020-09-19 RX ORDER — METOCLOPRAMIDE HYDROCHLORIDE 5 MG/ML
5 INJECTION INTRAMUSCULAR; INTRAVENOUS EVERY 8 HOURS PRN
Status: DISCONTINUED | OUTPATIENT
Start: 2020-09-19 | End: 2020-09-22

## 2020-09-19 RX ORDER — MORPHINE SULFATE 2 MG/ML
1 INJECTION, SOLUTION INTRAMUSCULAR; INTRAVENOUS EVERY 2 HOUR PRN
Status: DISCONTINUED | OUTPATIENT
Start: 2020-09-19 | End: 2020-09-22

## 2020-09-19 NOTE — H&P
[facility] Hospitalist H&P       CC: Patient presents with:  Nausea/Vomiting/Diarrhea       PCP: Lacie Monte    History of Present Illness: 54 y/o m w hx of type 1 DM p/w emesis x 3 days found to be in dka, pt last hospitalized 2017 for dka w emesis s/p egd w e no murmur, rub or gallop appreciated   Abdomen:   Soft, non-tender. Bowel sounds normal. No masses,  No organomegaly. Non distended   Extremities: Extremities normal, atraumatic, no cyanosis or edema.    Skin: Skin color, texture, turgor normal. No rashes o

## 2020-09-20 PROCEDURE — 99222 1ST HOSP IP/OBS MODERATE 55: CPT | Performed by: INTERNAL MEDICINE

## 2020-09-20 PROCEDURE — 99254 IP/OBS CNSLTJ NEW/EST MOD 60: CPT | Performed by: INTERNAL MEDICINE

## 2020-09-20 RX ORDER — CHLORPROMAZINE HYDROCHLORIDE 25 MG/ML
25 INJECTION INTRAMUSCULAR EVERY 6 HOURS PRN
Status: DISCONTINUED | OUTPATIENT
Start: 2020-09-20 | End: 2020-09-22

## 2020-09-20 RX ORDER — POTASSIUM CHLORIDE 14.9 MG/ML
20 INJECTION INTRAVENOUS ONCE
Status: COMPLETED | OUTPATIENT
Start: 2020-09-20 | End: 2020-09-20

## 2020-09-20 RX ORDER — DEXTROSE MONOHYDRATE 25 G/50ML
50 INJECTION, SOLUTION INTRAVENOUS
Status: DISCONTINUED | OUTPATIENT
Start: 2020-09-20 | End: 2020-09-20

## 2020-09-20 NOTE — PROGRESS NOTES
[facility]    Progress Note    Jatinder Chaves Patient Status:  Inpatient    1970 MRN [id number]   Location [facility] 2W/SW Attending Shayne Duncan MD   [facility] Day # 2 PCP None Pcp     Subjective:  Feels better  No more nausea

## 2020-09-20 NOTE — PLAN OF CARE
Patient reports feeling chills and feeling an inability to breath comfortably laying down. Placed patient on 2L for comfort, helped adjust HOB, and provided warm blankets. Will monitor for changes in symptoms.

## 2020-09-20 NOTE — CONSULTS
[facility]    Report of Consultation    Tera Ferrisvero Patient Status:  Inpatient    1970 MRN [id number]   Location [facility] 2W/SW Attending Josue Razo MD   [facility] Day # 1 PCP None Pcp     Date of Admission:  2020  Da in Sodium Chloride 0.9 % 10 mL IV push, 40 mg, Intravenous, Q12H  •  Normal Saline Flush 0.9 % injection 3 mL, 3 mL, Intravenous, PRN  •  morphINE sulfate (PF) 2 MG/ML injection 1 mg, 1 mg, Intravenous, Q2H PRN **OR** morphINE sulfate (PF) 2 MG/ML injectio inpatient DM management    PLAN:   - Levemir 18 daily  - Novolog 4 along with low dose CF with meals  - Accuchecks ac and hs  - Hypoglycemia protocol    Thank you for the consult. We will follow.    Discussed that he should never omit long acting insulin, c

## 2020-09-20 NOTE — PROGRESS NOTES
[facility] Hospitalist Progress Note     CC: Hospital Follow up    PCP: None Pcp       Assessment/Plan:     Principal Problem:    Type 1 diabetes mellitus with ketoacidosis [facility]    52 y/o m w hx of type 1 DM p/w emesis x 3 days found to be in dka    13.6 11.9*   HCT 40.8 34.6*   MCV 96.2 93.3   MCH 32.1 32.1   MCHC 33.3 34.4   RDW 12.2 12.0   NEPRELIM 6.46  --    WBC 8.2 9.4   .0 280.0         Recent Labs   Lab 09/19/20  1718 09/20/20  0405   * 141*   BUN 66* 55*   CREATSERUM 2.56* 1.82*

## 2020-09-20 NOTE — PLAN OF CARE
Problem: Patient Centered Care  Goal: Patient preferences are identified and integrated in the patient's plan of care  Description: Interventions:  - What would you like us to know as we care for you? Keep me updated on my care, I'm a type 1 diabetic.   - non-pharmacological measures as appropriate and evaluate response  - Consider cultural and social influences on pain and pain management  - Manage/alleviate anxiety  - Utilize distraction and/or relaxation techniques  - Monitor for opioid side effects  - N

## 2020-09-20 NOTE — CONSULTS
[facility]  Report of GI Consultation    Hutnerdonnie Birmingham Patient Status:  Inpatient    1970 MRN [id number]   Location [facility] 2W/SW Attending Jenelle Tavares MD   [facility] Day # 1 PCP None Pcp     Date of Admission:  2020  Alvaro DKA.    Radiation therapy for the DKA with noncontrast CT abdomen pelvis showed:  · No acute process; normal liver pancreas and biliary tree on this noncontrast study.   · \"Moderately extensive vascular calcification of the descending abdominal aorta and t evening showed DKA with blood glucose 497; BUN 66 creatinine 2.56 CO2 of 14; pseudohyponatremia sodium 127. Hemoglobin A1c is 9.2% this time.     CT scan for the abdominal pain and vomiting of DKA is showing no acute process, impressive calcifications, vas morphINE sulfate (PF) 2 MG/ML injection 1 mg, 1 mg, Intravenous, Q2H PRN    Or    •  morphINE sulfate (PF) 2 MG/ML injection 2 mg, 2 mg, Intravenous, Q2H PRN    Or    •  morphINE sulfate (PF) 4 MG/ML injection 4 mg, 4 mg, Intravenous, Q2H PRN    •  ondanse Thin/gaunt but bright outgoing jovial gentleman sitting up in bed watching TV eating his lunch. He looks well.   HEENT: Normocephalic; pupils equally round and reactive to light; no temporal wasting; no thyromegaly or cervical lymphadenopathy  PULM: Breath

## 2020-09-20 NOTE — ED PROVIDER NOTES
Patient Seen in: [facility] AND [facility] Emergency Department      History   Patient presents with:  Nausea/Vomiting/Diarrhea    Stated Complaint: diabetic, vomiting    HPI    42-year-old male with type 1 diabetes presents for evaluation of vomiting.   Patient normal.   Neck:      Musculoskeletal: Normal range of motion and neck supple. Cardiovascular:      Rate and Rhythm: Normal rate and regular rhythm. Heart sounds: Normal heart sounds.    Pulmonary:      Effort: Pulmonary effort is normal. No respirato the following components:    POC Glucose  450 (*)     All other components within normal limits   POCT GLUCOSE - Abnormal; Notable for the following components:    POC Glucose  377 (*)     All other components within normal limits   POCT GLUCOSE - Abnormal Radiology Data Registry) which includes the Dose Index Registry. FINDINGS:     LIVER: No enlargement, atrophy, abnormal density, or significant focal     lesion. GALLBLADDER: Normal size and appearance.     BILIARY: No visible dilatation or endoscopy     advised.                    Dictated by (CST): Gene Solorio MD on 9/19/2020 at 7:07 PM         Finalized by (CST): Gene Solorio MD on 9/19/2020 at 7:17 PM                 ED Medications Administered:   Medications   Normal Saline Flush 0.9 Resp: 21 15 18 17   Temp:       TempSrc:       SpO2: 99% 100% 98% 92%   Weight:       Height:         *I personally reviewed and interpreted all ED vitals.           MDM      Differential Diagnosis includes but is not limited to: DKA, foodborne illness, g evaluation and treatment.       Condition upon disposition: Stable    Admission disposition: 9/19/2020  7:07 PM           I spent a total of 35 minutes of critical care time in obtaining history, performing a physical exam, bedside monitoring of interventio

## 2020-09-21 ENCOUNTER — TELEPHONE (OUTPATIENT)
Dept: GASTROENTEROLOGY | Facility: CLINIC | Age: 50
End: 2020-09-21

## 2020-09-21 ENCOUNTER — TELEPHONE (OUTPATIENT)
Dept: ENDOCRINOLOGY | Facility: HOSPITAL | Age: 50
End: 2020-09-21

## 2020-09-21 PROCEDURE — 99232 SBSQ HOSP IP/OBS MODERATE 35: CPT | Performed by: INTERNAL MEDICINE

## 2020-09-21 PROCEDURE — 99232 SBSQ HOSP IP/OBS MODERATE 35: CPT | Performed by: PHYSICIAN ASSISTANT

## 2020-09-21 RX ORDER — POTASSIUM CHLORIDE 20 MEQ/1
40 TABLET, EXTENDED RELEASE ORAL ONCE
Status: COMPLETED | OUTPATIENT
Start: 2020-09-21 | End: 2020-09-21

## 2020-09-21 NOTE — PROGRESS NOTES
PA denied, pt does not have a diagnoses of Major depressive disorder    Pt must try and fail 2 medications    prozac, Paxil, Celexa, Zoloft, Lexapro, Effexor, Cymbalta, Wellbutrin, Elavil    LMOM to return call to the office.  Provided pt office phone [phone]) [facility]     Gastroenterology Progress Note    Jason Tenorio Patient Status:  Inpatient    1970 MRN [id number]   Location [facility] 2W/SW Attending Fransisca Hernandez, [address] Day # 2 PCP None Pcp     Subjective:   Trisha Thomas him and advised him in the absence of dysphagia/odynophagia or abdominal pain, he should follow up in the GI clinic for elective upper endoscopy to further evaluate this abnormality.  It is possible this finding was related to esophagitis in light of vomiti bowel obstruction. Appendix not visualized. No diverticulitis. 2. No kidney stone or hydronephrosis. 3. Moderately extensive vascular calcification more likely related to type 1 diabetes.  4. Abnormal thickening of the distal esophagus, and I cannot diffe

## 2020-09-21 NOTE — PAYOR COMM NOTE
--------------  ADMISSION REVIEW     Juana Mcmullen #:  [id number]  Authorization Number: PENDING    Admit date: 9/19/20  Admit time: 2028       Admitting Physician: Elder Dixon MD  Attending Physician:  Elder Dixon MD  Primary Care Physici Vitals signs and nursing note reviewed. Constitutional:       General: He is not in acute distress. Appearance: He is well-developed. HENT:      Head: Normocephalic and atraumatic.    Eyes:      Conjunctiva/sclera: Conjunctivae normal.   Neck: Estimated Average Glucose 217 (*)     All other components within normal limits   POCT GLUCOSE - Abnormal; Notable for the following components:    POC Glucose  554 (*)     All other components within normal limits   POCT GLUCOSE - Abnormal; Notable for t Differential Diagnosis includes but is not limited to: DKA, foodborne illness, gastroenteritis, gastritis, pancreatitis, obstruction, PUD    Limitations of history:   able to obtain history from patient  Factors limiting our ability to obtain a history: No I spent a total of 35 minutes of critical care time in obtaining history, performing a physical exam, bedside monitoring of interventions, collecting and interpreting tests, and discussion with consultants but not including time spent performing procedures Throat: Lips, mucosa, and tongue normal. Teeth and gums normal.   Neck: Supple, symmetrical, trachea midline, no cervical or supraclavicular lymph adenopathy, thyroid: no enlargment/tenderness/nodules appreciated   Lungs:   Clear to auscultation bilaterall   CREATSERUM 1.82 (H) 09/20/2020     BUN 55 (H) 09/20/2020      09/20/2020     K 3.7 09/20/2020     CO2 28.0 09/20/2020     CA 9.4 09/20/2020     ALB 3.6 09/20/2020     ALKPHO 62 09/20/2020     TP 7.1 09/20/2020     AST 10 (L) 09/20/2020     ALT 27 0 Patient is a 53year old male with Type 1 DM who presented with vomiting and was found to be in DKA.    Endocrinology is consulted for inpatient DM management     PLAN:   - Levemir 18 daily  - Novolog 4 along with low dose CF with meals  - Accuchecks ac and Insulin Aspart Pen (NOVOLOG) 100 UNIT/ML flexpen 4 Units   Dose: 4 Units  Freq: 3 times daily with meals Route: SC  Start: 09/20/20 1200 End: 09/21/20 0746     1152-Given   1803-Given [C]        0746-D/C'd          Insulin Aspart Pen (NOVOLOG) 100 UNIT/ PRN Comment:  For blood glucose < 200 mg/dL  Last Dose: 75 mL/hr (09/20/20 0017)  Start: 09/19/20 2053 0017-New Bag             Insulin Regular Human (NOVOLIN R) 100 Units in sodium chloride 0.9% 100 mL infusion   Rate: 1-28 mL/hr Dose: 1-28 Units/hr  F Dose: 5 mg  Freq: Every 8 hours PRN Route: IV  PRN Reasons: Nausea,vomiting  Start: 09/19/20 2038     0125-Given                 REVIEWER COMMENTS:     FOR REVIEW/APPROVAL OF INPT ICU ADMISSION

## 2020-09-21 NOTE — DIABETES ED
[facility]   Diabetes Education Note    Brannon Escalona  Patient Status Inpatient    1970  Location [facility] 2W/SW  Attending Maryanne Sports, [address] Day # 2  PCP  None Pcp      Reason for Visit:  Diabetic Ketoacidosis

## 2020-09-21 NOTE — TELEPHONE ENCOUNTER
INPATIENT ORDERS:  - please arrange for outpatient f/u in GI clinic with Dr. Roslyn Lewis or Chiara SHORE in 4-6 weeks for non-emergent hospital follow up  - discuss elective EGD d/t abnormal CT (esophagus)  - consider colonoscopy discussion in light of his family

## 2020-09-21 NOTE — PLAN OF CARE
Problem: Patient Centered Care  Goal: Patient preferences are identified and integrated in the patient's plan of care  Description: Interventions:  - What would you like us to know as we care for you? Keep me updated on my care, I'm a type 1 diabetic.   -

## 2020-09-21 NOTE — PROGRESS NOTES
[facility] Hospitalist Progress Note     CC: Hospital Follow up    PCP: None Pcp       Assessment/Plan:     Principal Problem:    Type 1 diabetes mellitus with ketoacidosis [facility]    54 y/o m w hx of type 1 DM p/w emesis x 3 days found to be in dka    edema      Data Review:       Labs:     Recent Labs   Lab 09/19/20  1723 09/20/20  0405   RBC 4.24* 3.71*   HGB 13.6 11.9*   HCT 40.8 34.6*   MCV 96.2 93.3   MCH 32.1 32.1   MCHC 33.3 34.4   RDW 12.2 12.0   NEPRELIM 6.46  --    WBC 8.2 9.4   .0 280.

## 2020-09-22 VITALS
OXYGEN SATURATION: 97 % | BODY MASS INDEX: 21.7 KG/M2 | HEIGHT: 71 IN | HEART RATE: 85 BPM | SYSTOLIC BLOOD PRESSURE: 125 MMHG | DIASTOLIC BLOOD PRESSURE: 85 MMHG | WEIGHT: 155 LBS | RESPIRATION RATE: 19 BRPM | TEMPERATURE: 98 F

## 2020-09-22 PROCEDURE — 99232 SBSQ HOSP IP/OBS MODERATE 35: CPT | Performed by: INTERNAL MEDICINE

## 2020-09-22 NOTE — PROGRESS NOTES
[facility]    Progress Note    Anisha Harmon Patient Status:  Inpatient    1970 MRN [id number]   Location [facility] 2W/SW Attending Bj Diamond MD   Hosp Day # 3 PCP None Pcp     Subjective:  Feels better  No more nausea

## 2020-09-22 NOTE — DISCHARGE SUMMARY
[facility] Internal Medicine Discharge Summary   Patient ID:  Fabricio Dawn  [id number]  53year old  7/6/1970    Admit date: 9/19/2020    Discharge date and time: 9/22/2020  1:27 PM      Attending Physician: No att. providers found     Primary Care Physician: Abdirahman Heller ENDOCRINOLOGY  IP CONSULT TO GASTROENTEROLOGY    Radiology: Ct Abdomen+pelvis(cpt=74176)    Result Date: 9/19/2020  PROCEDURE:   CT ABDOMEN+PELVIS(CPT=74176)  COMPARISON:   [facility], CT PF ABD [address]*, 4/28/2005, 4:18 PM.  Volodymyr Vasquez fracture. LUNG BASES: No visible pulmonary or pleural disease. OTHER: Negative. CONCLUSION:  1. There is no acute bowel inflammatory process. No definite bowel obstruction. Appendix not visualized. No diverticulitis.  2. No kidney stone or hydro

## 2020-09-23 ENCOUNTER — TELEPHONE (OUTPATIENT)
Dept: MEDSURG UNIT | Facility: HOSPITAL | Age: 50
End: 2020-09-23

## 2020-09-23 NOTE — PAYOR COMM NOTE
--------------  DISCHARGE REVIEW    HammadKlaus Leone #:  [id number]  Authorization Number: PENDING    Admit date: 9/19/20  Admit time:  2028  Discharge Date: 9/22/2020  1:27 PM     Admitting Physician: Rachel Kee MD  Attending Physician:  Lavern esophagitis and gastritis. No further episodes of  Emesis. Seen by endo and GI.   Narayan Nguyen for d/c on home regimen per endo and plan for outpt scope per GI,  Hgb stable    Discharge Diagnoses:   Dka: d/t emesis vs noncompliance w levemir  -gap closed  -insulin

## 2021-08-16 ENCOUNTER — HOSPITAL ENCOUNTER (OUTPATIENT)
Facility: HOSPITAL | Age: 51
Setting detail: OBSERVATION
Discharge: HOME | DRG: 638 | End: 2021-08-18
Attending: EMERGENCY MEDICINE | Admitting: HOSPITALIST
Payer: MEDICAID

## 2021-08-16 DIAGNOSIS — E10.10 TYPE 1 DIABETES MELLITUS WITH KETOACIDOSIS WITHOUT COMA (HCC): Primary | ICD-10-CM

## 2021-08-16 DIAGNOSIS — N17.9 ACUTE KIDNEY INJURY (HCC): ICD-10-CM

## 2021-08-16 LAB
ALBUMIN SERPL-MCNC: 4.2 G/DL (ref 3.4–5)
ALP LIVER SERPL-CCNC: 108 U/L
ALT SERPL-CCNC: 30 U/L
ANION GAP SERPL CALC-SCNC: 15 MMOL/L (ref 0–18)
ANION GAP SERPL CALC-SCNC: 28 MMOL/L (ref 0–18)
AST SERPL-CCNC: 41 U/L (ref 15–37)
BASOPHILS # BLD AUTO: 0.03 X10(3) UL (ref 0–0.2)
BASOPHILS NFR BLD AUTO: 0.2 %
BILIRUB DIRECT SERPL-MCNC: 0.1 MG/DL (ref 0–0.2)
BILIRUB SERPL-MCNC: 0.6 MG/DL (ref 0.1–2)
BILIRUB UR QL: NEGATIVE
BUN BLD-MCNC: 37 MG/DL (ref 7–18)
BUN BLD-MCNC: 47 MG/DL (ref 7–18)
BUN/CREAT SERPL: 20.4 (ref 10–20)
BUN/CREAT SERPL: 22.4 (ref 10–20)
CALCIUM BLD-MCNC: 8.8 MG/DL (ref 8.5–10.1)
CALCIUM BLD-MCNC: 9.3 MG/DL (ref 8.5–10.1)
CHLORIDE SERPL-SCNC: 103 MMOL/L (ref 98–112)
CHLORIDE SERPL-SCNC: 90 MMOL/L (ref 98–112)
CLARITY UR: CLEAR
CO2 SERPL-SCNC: 11 MMOL/L (ref 21–32)
CO2 SERPL-SCNC: 18 MMOL/L (ref 21–32)
COLOR UR: YELLOW
CREAT BLD-MCNC: 1.65 MG/DL
CREAT BLD-MCNC: 2.3 MG/DL
DEPRECATED RDW RBC AUTO: 46.5 FL (ref 35.1–46.3)
EOSINOPHIL # BLD AUTO: 0 X10(3) UL (ref 0–0.7)
EOSINOPHIL NFR BLD AUTO: 0 %
ERYTHROCYTE [DISTWIDTH] IN BLOOD BY AUTOMATED COUNT: 12.9 % (ref 11–15)
EST. AVERAGE GLUCOSE BLD GHB EST-MCNC: 220 MG/DL (ref 68–126)
GLUCOSE BLD-MCNC: 171 MG/DL (ref 70–99)
GLUCOSE BLD-MCNC: 557 MG/DL (ref 70–99)
GLUCOSE BLDC GLUCOMTR-MCNC: 126 MG/DL (ref 70–99)
GLUCOSE BLDC GLUCOMTR-MCNC: 145 MG/DL (ref 70–99)
GLUCOSE BLDC GLUCOMTR-MCNC: 182 MG/DL (ref 70–99)
GLUCOSE BLDC GLUCOMTR-MCNC: 199 MG/DL (ref 70–99)
GLUCOSE BLDC GLUCOMTR-MCNC: 234 MG/DL (ref 70–99)
GLUCOSE BLDC GLUCOMTR-MCNC: 269 MG/DL (ref 70–99)
GLUCOSE BLDC GLUCOMTR-MCNC: 275 MG/DL (ref 70–99)
GLUCOSE BLDC GLUCOMTR-MCNC: 390 MG/DL (ref 70–99)
GLUCOSE BLDC GLUCOMTR-MCNC: 474 MG/DL (ref 70–99)
GLUCOSE BLDC GLUCOMTR-MCNC: 501 MG/DL (ref 70–99)
GLUCOSE BLDC GLUCOMTR-MCNC: 541 MG/DL (ref 70–99)
GLUCOSE UR-MCNC: >=500 MG/DL
HAV IGM SER QL: 1.7 MG/DL (ref 1.6–2.6)
HAV IGM SER QL: 2.2 MG/DL (ref 1.6–2.6)
HBA1C MFR BLD HPLC: 9.3 % (ref ?–5.7)
HCT VFR BLD AUTO: 35.4 %
HGB BLD-MCNC: 11.7 G/DL
IMM GRANULOCYTES # BLD AUTO: 0.08 X10(3) UL (ref 0–1)
IMM GRANULOCYTES NFR BLD: 0.5 %
KETONES UR-MCNC: 80 MG/DL
LEUKOCYTE ESTERASE UR QL STRIP.AUTO: NEGATIVE
LYMPHOCYTES # BLD AUTO: 0.51 X10(3) UL (ref 1–4)
LYMPHOCYTES NFR BLD AUTO: 3.5 %
M PROTEIN MFR SERPL ELPH: 8.5 G/DL (ref 6.4–8.2)
MCH RBC QN AUTO: 32.3 PG (ref 26–34)
MCHC RBC AUTO-ENTMCNC: 33.1 G/DL (ref 31–37)
MCV RBC AUTO: 97.8 FL
MONOCYTES # BLD AUTO: 0.63 X10(3) UL (ref 0.1–1)
MONOCYTES NFR BLD AUTO: 4.3 %
NEUTROPHILS # BLD AUTO: 13.35 X10 (3) UL (ref 1.5–7.7)
NEUTROPHILS # BLD AUTO: 13.35 X10(3) UL (ref 1.5–7.7)
NEUTROPHILS NFR BLD AUTO: 91.5 %
NITRITE UR QL STRIP.AUTO: NEGATIVE
OSMOLALITY SERPL CALC.SUM OF ELEC: 295 MOSM/KG (ref 275–295)
OSMOLALITY SERPL CALC.SUM OF ELEC: 306 MOSM/KG (ref 275–295)
PH UR: 5 [PH] (ref 5–8)
PHOSPHATE SERPL-MCNC: 1.4 MG/DL (ref 2.5–4.9)
PLATELET # BLD AUTO: 323 10(3)UL (ref 150–450)
POTASSIUM SERPL-SCNC: 3.8 MMOL/L (ref 3.5–5.1)
POTASSIUM SERPL-SCNC: 4.3 MMOL/L (ref 3.5–5.1)
PROT UR-MCNC: NEGATIVE MG/DL
RBC # BLD AUTO: 3.62 X10(6)UL
SARS-COV-2 RNA RESP QL NAA+PROBE: NOT DETECTED
SODIUM SERPL-SCNC: 129 MMOL/L (ref 136–145)
SODIUM SERPL-SCNC: 136 MMOL/L (ref 136–145)
SP GR UR STRIP: 1.02 (ref 1–1.03)
UROBILINOGEN UR STRIP-ACNC: <2
WBC # BLD AUTO: 14.6 X10(3) UL (ref 4–11)

## 2021-08-16 PROCEDURE — 99223 1ST HOSP IP/OBS HIGH 75: CPT | Performed by: HOSPITALIST

## 2021-08-16 RX ORDER — LISINOPRIL AND HYDROCHLOROTHIAZIDE 25; 20 MG/1; MG/1
1 TABLET ORAL DAILY
Status: DISCONTINUED | OUTPATIENT
Start: 2021-08-17 | End: 2021-08-16

## 2021-08-16 RX ORDER — SODIUM CHLORIDE 9 MG/ML
INJECTION, SOLUTION INTRAVENOUS CONTINUOUS
Status: DISCONTINUED | OUTPATIENT
Start: 2021-08-16 | End: 2021-08-17

## 2021-08-16 RX ORDER — LISINOPRIL AND HYDROCHLOROTHIAZIDE 25; 20 MG/1; MG/1
1 TABLET ORAL DAILY
COMMUNITY

## 2021-08-16 RX ORDER — DEXTROSE MONOHYDRATE 25 G/50ML
50 INJECTION, SOLUTION INTRAVENOUS
Status: DISCONTINUED | OUTPATIENT
Start: 2021-08-16 | End: 2021-08-18

## 2021-08-16 RX ORDER — ACETAMINOPHEN 325 MG/1
650 TABLET ORAL EVERY 6 HOURS PRN
Status: DISCONTINUED | OUTPATIENT
Start: 2021-08-16 | End: 2021-08-18

## 2021-08-16 RX ORDER — ONDANSETRON 2 MG/ML
4 INJECTION INTRAMUSCULAR; INTRAVENOUS EVERY 6 HOURS PRN
Status: DISCONTINUED | OUTPATIENT
Start: 2021-08-16 | End: 2021-08-18

## 2021-08-16 RX ORDER — AMLODIPINE BESYLATE 10 MG/1
10 TABLET ORAL DAILY
Status: DISCONTINUED | OUTPATIENT
Start: 2021-08-17 | End: 2021-08-18

## 2021-08-16 RX ORDER — AMLODIPINE BESYLATE 10 MG/1
10 TABLET ORAL DAILY
COMMUNITY

## 2021-08-16 RX ORDER — PROCHLORPERAZINE EDISYLATE 5 MG/ML
5 INJECTION INTRAMUSCULAR; INTRAVENOUS EVERY 8 HOURS PRN
Status: DISCONTINUED | OUTPATIENT
Start: 2021-08-16 | End: 2021-08-18

## 2021-08-16 RX ORDER — HEPARIN SODIUM 5000 [USP'U]/ML
5000 INJECTION, SOLUTION INTRAVENOUS; SUBCUTANEOUS EVERY 12 HOURS SCHEDULED
Status: DISCONTINUED | OUTPATIENT
Start: 2021-08-16 | End: 2021-08-18

## 2021-08-16 RX ORDER — PANTOPRAZOLE SODIUM 40 MG/1
40 TABLET, DELAYED RELEASE ORAL
Status: DISCONTINUED | OUTPATIENT
Start: 2021-08-17 | End: 2021-08-18

## 2021-08-16 RX ORDER — ONDANSETRON 2 MG/ML
4 INJECTION INTRAMUSCULAR; INTRAVENOUS ONCE
Status: COMPLETED | OUTPATIENT
Start: 2021-08-16 | End: 2021-08-16

## 2021-08-16 RX ORDER — DEXTROSE AND SODIUM CHLORIDE 5; .9 G/100ML; G/100ML
INJECTION, SOLUTION INTRAVENOUS CONTINUOUS
Status: DISCONTINUED | OUTPATIENT
Start: 2021-08-16 | End: 2021-08-18

## 2021-08-16 NOTE — ED PROVIDER NOTES
Patient Seen in: [facility] AND [facility] Emergency Department      History   Patient presents with:  Nausea/vomiting    Stated Complaint: nausea     HPI/Subjective:   HPI    Patient is a 81-year-old type I diabetic who presents with vomiting, diarrhea and abdo Course     Labs Reviewed   BASIC METABOLIC PANEL (8) - Abnormal; Notable for the following components:       Result Value    Glucose 557 (*)     Sodium 129 (*)     Chloride 90 (*)     CO2 11.0 (*)     Anion Gap 28 (*)     BUN 47 (*)     Creatinine 2.30 (*) Record Review: I personally reviewed available prior medical records for any recent pertinent discharge summaries, testing, and procedures and reviewed those reports. Critical Care:   I spent a total of 60 minutes of critical care time in obtaining histo

## 2021-08-16 NOTE — ED INITIAL ASSESSMENT (HPI)
Patient complains of n/v for the past few days, states his sugars have been high, states his meter reads \"error\"

## 2021-08-16 NOTE — H&P
[facility]    PATIENT'S NAME: Zara Gunnar PHYSICIAN: Bill Schmidt MD   PATIENT ACCOUNT#:   [id number]    LOCATION:  [place] [address]  MEDICAL RECORD #:   [id number]       YOB: 1970  ADMISSION DATE:       08/ Overall underweight. VITAL SIGNS:  Temperature 98.0, pulse 90, respiratory rate 18, blood pressure 120/75, pulse ox 100% on room air. HEENT:  Atraumatic. Oropharynx clear, dry mucous membranes. Ketone odor noted. NECK:  Supple. No lymphadenopathy.   Dinwiddie Peat

## 2021-08-16 NOTE — PLAN OF CARE
Pt received on insulin gtt. Gtt continued. Pt oriented to unit. Hemodynamically stable. IVF started. Pt complaining of poor appetite and hiccups. SCDs on.     Problem: Patient Centered Care  Goal: Patient preferences are identified and integrated in the pat

## 2021-08-16 NOTE — ED QUICK NOTES
Patient taken to floor with RN and monitor. Bedside handoff done with [facility]. Patient aware of plan of care, verbalizes understanding. No issues during transport. Belongings with patient.

## 2021-08-17 LAB
ALBUMIN SERPL-MCNC: 3.3 G/DL (ref 3.4–5)
ALBUMIN/GLOB SERPL: 1 {RATIO} (ref 1–2)
ALP LIVER SERPL-CCNC: 76 U/L
ALT SERPL-CCNC: 19 U/L
ANION GAP SERPL CALC-SCNC: 11 MMOL/L (ref 0–18)
ANION GAP SERPL CALC-SCNC: 12 MMOL/L (ref 0–18)
ANION GAP SERPL CALC-SCNC: 6 MMOL/L (ref 0–18)
ANION GAP SERPL CALC-SCNC: 6 MMOL/L (ref 0–18)
ANION GAP SERPL CALC-SCNC: 7 MMOL/L (ref 0–18)
ANION GAP SERPL CALC-SCNC: 8 MMOL/L (ref 0–18)
AST SERPL-CCNC: 21 U/L (ref 15–37)
BASOPHILS # BLD AUTO: 0.03 X10(3) UL (ref 0–0.2)
BASOPHILS NFR BLD AUTO: 0.3 %
BILIRUB SERPL-MCNC: 0.6 MG/DL (ref 0.1–2)
BUN BLD-MCNC: 13 MG/DL (ref 7–18)
BUN BLD-MCNC: 17 MG/DL (ref 7–18)
BUN BLD-MCNC: 20 MG/DL (ref 7–18)
BUN BLD-MCNC: 23 MG/DL (ref 7–18)
BUN BLD-MCNC: 23 MG/DL (ref 7–18)
BUN BLD-MCNC: 31 MG/DL (ref 7–18)
BUN/CREAT SERPL: 11.6 (ref 10–20)
BUN/CREAT SERPL: 14.2 (ref 10–20)
BUN/CREAT SERPL: 14.8 (ref 10–20)
BUN/CREAT SERPL: 16.9 (ref 10–20)
BUN/CREAT SERPL: 16.9 (ref 10–20)
BUN/CREAT SERPL: 20.4 (ref 10–20)
CALCIUM BLD-MCNC: 8.4 MG/DL (ref 8.5–10.1)
CALCIUM BLD-MCNC: 8.4 MG/DL (ref 8.5–10.1)
CALCIUM BLD-MCNC: 8.7 MG/DL (ref 8.5–10.1)
CALCIUM BLD-MCNC: 8.7 MG/DL (ref 8.5–10.1)
CALCIUM BLD-MCNC: 8.8 MG/DL (ref 8.5–10.1)
CALCIUM BLD-MCNC: 9 MG/DL (ref 8.5–10.1)
CHLORIDE SERPL-SCNC: 101 MMOL/L (ref 98–112)
CHLORIDE SERPL-SCNC: 102 MMOL/L (ref 98–112)
CHLORIDE SERPL-SCNC: 104 MMOL/L (ref 98–112)
CHLORIDE SERPL-SCNC: 106 MMOL/L (ref 98–112)
CO2 SERPL-SCNC: 21 MMOL/L (ref 21–32)
CO2 SERPL-SCNC: 21 MMOL/L (ref 21–32)
CO2 SERPL-SCNC: 25 MMOL/L (ref 21–32)
CO2 SERPL-SCNC: 27 MMOL/L (ref 21–32)
CO2 SERPL-SCNC: 27 MMOL/L (ref 21–32)
CO2 SERPL-SCNC: 28 MMOL/L (ref 21–32)
CREAT BLD-MCNC: 1.12 MG/DL
CREAT BLD-MCNC: 1.2 MG/DL
CREAT BLD-MCNC: 1.35 MG/DL
CREAT BLD-MCNC: 1.36 MG/DL
CREAT BLD-MCNC: 1.36 MG/DL
CREAT BLD-MCNC: 1.52 MG/DL
DEPRECATED RDW RBC AUTO: 44.6 FL (ref 35.1–46.3)
EOSINOPHIL # BLD AUTO: 0.04 X10(3) UL (ref 0–0.7)
EOSINOPHIL NFR BLD AUTO: 0.4 %
ERYTHROCYTE [DISTWIDTH] IN BLOOD BY AUTOMATED COUNT: 12.8 % (ref 11–15)
GLOBULIN PLAS-MCNC: 3.2 G/DL (ref 2.8–4.4)
GLUCOSE BLD-MCNC: 111 MG/DL (ref 70–99)
GLUCOSE BLD-MCNC: 121 MG/DL (ref 70–99)
GLUCOSE BLD-MCNC: 123 MG/DL (ref 70–99)
GLUCOSE BLD-MCNC: 273 MG/DL (ref 70–99)
GLUCOSE BLD-MCNC: 75 MG/DL (ref 70–99)
GLUCOSE BLD-MCNC: 75 MG/DL (ref 70–99)
GLUCOSE BLDC GLUCOMTR-MCNC: 108 MG/DL (ref 70–99)
GLUCOSE BLDC GLUCOMTR-MCNC: 109 MG/DL (ref 70–99)
GLUCOSE BLDC GLUCOMTR-MCNC: 110 MG/DL (ref 70–99)
GLUCOSE BLDC GLUCOMTR-MCNC: 110 MG/DL (ref 70–99)
GLUCOSE BLDC GLUCOMTR-MCNC: 117 MG/DL (ref 70–99)
GLUCOSE BLDC GLUCOMTR-MCNC: 121 MG/DL (ref 70–99)
GLUCOSE BLDC GLUCOMTR-MCNC: 123 MG/DL (ref 70–99)
GLUCOSE BLDC GLUCOMTR-MCNC: 124 MG/DL (ref 70–99)
GLUCOSE BLDC GLUCOMTR-MCNC: 124 MG/DL (ref 70–99)
GLUCOSE BLDC GLUCOMTR-MCNC: 128 MG/DL (ref 70–99)
GLUCOSE BLDC GLUCOMTR-MCNC: 130 MG/DL (ref 70–99)
GLUCOSE BLDC GLUCOMTR-MCNC: 132 MG/DL (ref 70–99)
GLUCOSE BLDC GLUCOMTR-MCNC: 138 MG/DL (ref 70–99)
GLUCOSE BLDC GLUCOMTR-MCNC: 143 MG/DL (ref 70–99)
GLUCOSE BLDC GLUCOMTR-MCNC: 162 MG/DL (ref 70–99)
GLUCOSE BLDC GLUCOMTR-MCNC: 169 MG/DL (ref 70–99)
GLUCOSE BLDC GLUCOMTR-MCNC: 192 MG/DL (ref 70–99)
GLUCOSE BLDC GLUCOMTR-MCNC: 229 MG/DL (ref 70–99)
GLUCOSE BLDC GLUCOMTR-MCNC: 283 MG/DL (ref 70–99)
GLUCOSE BLDC GLUCOMTR-MCNC: 307 MG/DL (ref 70–99)
GLUCOSE BLDC GLUCOMTR-MCNC: 86 MG/DL (ref 70–99)
GLUCOSE BLDC GLUCOMTR-MCNC: 96 MG/DL (ref 70–99)
HAV IGM SER QL: 1.5 MG/DL (ref 1.6–2.6)
HAV IGM SER QL: 1.6 MG/DL (ref 1.6–2.6)
HAV IGM SER QL: 1.7 MG/DL (ref 1.6–2.6)
HCT VFR BLD AUTO: 28.2 %
HGB BLD-MCNC: 9.4 G/DL
IMM GRANULOCYTES # BLD AUTO: 0.03 X10(3) UL (ref 0–1)
IMM GRANULOCYTES NFR BLD: 0.3 %
LYMPHOCYTES # BLD AUTO: 1.62 X10(3) UL (ref 1–4)
LYMPHOCYTES NFR BLD AUTO: 14.8 %
M PROTEIN MFR SERPL ELPH: 6.5 G/DL (ref 6.4–8.2)
MCH RBC QN AUTO: 31.6 PG (ref 26–34)
MCHC RBC AUTO-ENTMCNC: 33.3 G/DL (ref 31–37)
MCV RBC AUTO: 94.9 FL
MONOCYTES # BLD AUTO: 1.25 X10(3) UL (ref 0.1–1)
MONOCYTES NFR BLD AUTO: 11.4 %
NEUTROPHILS # BLD AUTO: 7.99 X10 (3) UL (ref 1.5–7.7)
NEUTROPHILS # BLD AUTO: 7.99 X10(3) UL (ref 1.5–7.7)
NEUTROPHILS NFR BLD AUTO: 72.8 %
OSMOLALITY SERPL CALC.SUM OF ELEC: 285 MOSM/KG (ref 275–295)
OSMOLALITY SERPL CALC.SUM OF ELEC: 286 MOSM/KG (ref 275–295)
OSMOLALITY SERPL CALC.SUM OF ELEC: 286 MOSM/KG (ref 275–295)
OSMOLALITY SERPL CALC.SUM OF ELEC: 287 MOSM/KG (ref 275–295)
OSMOLALITY SERPL CALC.SUM OF ELEC: 288 MOSM/KG (ref 275–295)
OSMOLALITY SERPL CALC.SUM OF ELEC: 295 MOSM/KG (ref 275–295)
PHOSPHATE SERPL-MCNC: 0.7 MG/DL (ref 2.5–4.9)
PHOSPHATE SERPL-MCNC: 1.4 MG/DL (ref 2.5–4.9)
PHOSPHATE SERPL-MCNC: 1.5 MG/DL (ref 2.5–4.9)
PHOSPHATE SERPL-MCNC: 1.8 MG/DL (ref 2.5–4.9)
PHOSPHATE SERPL-MCNC: 1.9 MG/DL (ref 2.5–4.9)
PHOSPHATE SERPL-MCNC: 2 MG/DL (ref 2.5–4.9)
PLATELET # BLD AUTO: 267 10(3)UL (ref 150–450)
POTASSIUM SERPL-SCNC: 3 MMOL/L (ref 3.5–5.1)
POTASSIUM SERPL-SCNC: 3.3 MMOL/L (ref 3.5–5.1)
POTASSIUM SERPL-SCNC: 3.5 MMOL/L (ref 3.5–5.1)
POTASSIUM SERPL-SCNC: 3.5 MMOL/L (ref 3.5–5.1)
POTASSIUM SERPL-SCNC: 3.6 MMOL/L (ref 3.5–5.1)
POTASSIUM SERPL-SCNC: 4 MMOL/L (ref 3.5–5.1)
RBC # BLD AUTO: 2.97 X10(6)UL
SODIUM SERPL-SCNC: 133 MMOL/L (ref 136–145)
SODIUM SERPL-SCNC: 137 MMOL/L (ref 136–145)
SODIUM SERPL-SCNC: 139 MMOL/L (ref 136–145)
WBC # BLD AUTO: 11 X10(3) UL (ref 4–11)

## 2021-08-17 PROCEDURE — 99233 SBSQ HOSP IP/OBS HIGH 50: CPT | Performed by: HOSPITALIST

## 2021-08-17 PROCEDURE — 99223 1ST HOSP IP/OBS HIGH 75: CPT | Performed by: INTERNAL MEDICINE

## 2021-08-17 RX ORDER — MAGNESIUM OXIDE 400 MG (241.3 MG MAGNESIUM) TABLET
800 TABLET ONCE
Status: COMPLETED | OUTPATIENT
Start: 2021-08-17 | End: 2021-08-17

## 2021-08-17 NOTE — PROGRESS NOTES
[facility]    Progress Note    Farzana Fast Patient Status:  Inpatient    1970 MRN [id number]   Location [facility] 2W/SW Attending Gema Morse, [address] Day # 1 PCP None Pcp       Subjective:   Farzana Fast is a(n) (DEX-4) chewable tab 8 tablet, 8 tablet, Oral, Q15 Min PRN  Insulin Regular Human (NOVOLIN R) 100 Units in sodium chloride 0.9% 100 mL infusion, 1-28 Units/hr, Intravenous, Continuous  pantoprazole (PROTONIX) EC tab 40 mg, 40 mg, Oral, QAM AC  amLODIPine b patient will be admitted to progressive care unit. IV fluids, IV insulin drip, monitor his electrolytes. If blood glucose below 200, change IV fluids from normal saline to D5 normal saline; endocrinology consult was notified; antinausea medication.   Reza Taylor

## 2021-08-17 NOTE — PAYOR COMM NOTE
--------------  ADMISSION REVIEW     Payor: [address] [place] [address] #:  [id number]  Authorization Number: PENDING     Admit date: 8/16/21  Admit time:  5:45 PM       REVIEW DOCUMENTATION:     ED Provider Notes      ED Provider Notes signed by Jerri Glucose 557 (*)     Sodium 129 (*)     Chloride 90 (*)     CO2 11.0 (*)     Anion Gap 28 (*)     BUN 47 (*)     Creatinine 2.30 (*)     BUN/CREA Ratio 20.4 (*)     Calculated Osmolality 306 (*)     GFR, Non- 32 (*)     GFR, -American (Physician)         [facility]    PATIENT'S NAME: Smith Maloly PHYSICIAN: Elma Dinero MD   PATIENT ACCOUNT#:   [id number]    LOCATION:  [place] [address]  MEDICAL RECORD #:   [id number]       YOB: 1970  ADMIS admitted to progressive care unit. IV fluids, IV insulin drip, monitor his electrolytes. If blood glucose below 200, change IV fluids from normal saline to D5 normal saline; endocrinology consult was notified; antinausea medication.   Further recommendati 8/17/2021 1000 Rate/Dose Change 6 Units/hr Intravenous Giselle BRUNO Meneses    8/17/2021 0900 Rate/Dose Verify 10 Units/hr Intravenous Giselle BRUNO Meneses    8/17/2021 0800 Rate/Dose Change 10 Units/hr Intravenous [place] BRUNO Meneses    8/17/2021 0700 Lisa 38 1,000 mL Intravenous Danii De Leon RN      sodium chloride 0.9% IV bolus 1,000 mL     Date Action Dose Route User    8/16/2021 1503 New Bag 1,000 mL Intravenous Danii De Leon RN

## 2021-08-17 NOTE — PLAN OF CARE
Patient remains on insulin gtt. Blood sugar stabilized around 120's - 140's. Will continue to monitor. Hiccups have become less intense. PRN medications seem to be working.      Problem: Patient Centered Care  Goal: Patient preferences are identified and in

## 2021-08-17 NOTE — PLAN OF CARE
Pt remains resting comfortably in bed. Pt is A. Ox4, able to follow all commands. Pt remains on insulin gtt. VSS. Bed is locked, in lowest position; call light remains within reach.       Problem: Patient Centered Care  Goal: Patient preferences are identifi

## 2021-08-17 NOTE — CONSULTS
[facility]    Report of Consultation    Dwayne Kilpatrick Patient Status:  Inpatient    1970 MRN [id number]   Location [facility] 2W/SW Attending Abby Fischer MD   [facility] Day # 1 PCP None Pcp     Date of Admission:  2021 mL, Intravenous, Q15 Min PRN   Or  glucose (DEX4) oral liquid 30 g, 30 g, Oral, Q15 Min PRN   Or  glucose-vitamin C (DEX-4) chewable tab 8 tablet, 8 tablet, Oral, Q15 Min PRN  Insulin Regular Human (NOVOLIN R) 100 Units in sodium chloride 0.9% 100 mL infus mood and affect.     Results:     Laboratory Data:  Lab Results   Component Value Date    WBC 11.0 08/17/2021    HGB 9.4 (L) 08/17/2021    HCT 28.2 (L) 08/17/2021    .0 08/17/2021    CREATSERUM 1.36 (H) 08/17/2021    CREATSERUM 1.36 (H) 08/17/2021

## 2021-08-18 VITALS
SYSTOLIC BLOOD PRESSURE: 132 MMHG | OXYGEN SATURATION: 97 % | DIASTOLIC BLOOD PRESSURE: 98 MMHG | BODY MASS INDEX: 21 KG/M2 | TEMPERATURE: 98 F | HEART RATE: 88 BPM | RESPIRATION RATE: 21 BRPM | WEIGHT: 149.06 LBS

## 2021-08-18 LAB
BASOPHILS # BLD AUTO: 0.05 X10(3) UL (ref 0–0.2)
BASOPHILS NFR BLD AUTO: 0.6 %
DEPRECATED RDW RBC AUTO: 44.9 FL (ref 35.1–46.3)
EOSINOPHIL # BLD AUTO: 0.12 X10(3) UL (ref 0–0.7)
EOSINOPHIL NFR BLD AUTO: 1.5 %
ERYTHROCYTE [DISTWIDTH] IN BLOOD BY AUTOMATED COUNT: 12.9 % (ref 11–15)
GLUCOSE BLDC GLUCOMTR-MCNC: 75 MG/DL (ref 70–99)
GLUCOSE BLDC GLUCOMTR-MCNC: 84 MG/DL (ref 70–99)
HAV IGM SER QL: 1.4 MG/DL (ref 1.6–2.6)
HCT VFR BLD AUTO: 31.3 %
HGB BLD-MCNC: 10.3 G/DL
IMM GRANULOCYTES # BLD AUTO: 0.03 X10(3) UL (ref 0–1)
IMM GRANULOCYTES NFR BLD: 0.4 %
LYMPHOCYTES # BLD AUTO: 2.05 X10(3) UL (ref 1–4)
LYMPHOCYTES NFR BLD AUTO: 25.5 %
MCH RBC QN AUTO: 31.3 PG (ref 26–34)
MCHC RBC AUTO-ENTMCNC: 32.9 G/DL (ref 31–37)
MCV RBC AUTO: 95.1 FL
MONOCYTES # BLD AUTO: 1.19 X10(3) UL (ref 0.1–1)
MONOCYTES NFR BLD AUTO: 14.8 %
NEUTROPHILS # BLD AUTO: 4.59 X10 (3) UL (ref 1.5–7.7)
NEUTROPHILS # BLD AUTO: 4.59 X10(3) UL (ref 1.5–7.7)
NEUTROPHILS NFR BLD AUTO: 57.2 %
PHOSPHATE SERPL-MCNC: 2.3 MG/DL (ref 2.5–4.9)
PLATELET # BLD AUTO: 251 10(3)UL (ref 150–450)
POTASSIUM SERPL-SCNC: 3.4 MMOL/L (ref 3.5–5.1)
RBC # BLD AUTO: 3.29 X10(6)UL
WBC # BLD AUTO: 8 X10(3) UL (ref 4–11)

## 2021-08-18 PROCEDURE — 99239 HOSP IP/OBS DSCHRG MGMT >30: CPT | Performed by: HOSPITALIST

## 2021-08-18 RX ORDER — ACETAMINOPHEN 325 MG/1
650 TABLET ORAL EVERY 6 HOURS PRN
Qty: 1 TABLET | Refills: 0 | Status: SHIPPED | COMMUNITY
Start: 2021-08-18

## 2021-08-18 RX ORDER — MAGNESIUM OXIDE 400 MG (241.3 MG MAGNESIUM) TABLET
800 TABLET ONCE
Status: COMPLETED | OUTPATIENT
Start: 2021-08-18 | End: 2021-08-18

## 2021-08-18 RX ORDER — POTASSIUM CHLORIDE 14.9 MG/ML
20 INJECTION INTRAVENOUS ONCE
Status: COMPLETED | OUTPATIENT
Start: 2021-08-18 | End: 2021-08-18

## 2021-08-18 NOTE — DISCHARGE SUMMARY
Dc summary#[id number]  > 30 min spent on [address] Discharge Diagnoses: dka    Lace+ Score: 55  59-90 High Risk  29-58 Medium Risk  0-28   Low Risk. TCM Follow-Up Recommendation:  LACE > 58:  High Risk of readmission after discharge from the hospital.

## 2021-08-18 NOTE — PLAN OF CARE
Patient insulin gtt stopped at 2000. Now ACHS. Electrolytes replaced early in the morning, see MAR. Patient ambulating and slept through the night.  Will continue to monitor   Problem: Patient Centered Care  Goal: Patient preferences are identified and inte

## 2021-08-18 NOTE — PLAN OF CARE
Problem: Patient Centered Care  Goal: Patient preferences are identified and integrated in the patient's plan of care  Description: Interventions:  - What would you like us to know as we care for you?  I live with my brothers I Prince  - Provide timely,

## 2021-08-20 NOTE — DISCHARGE SUMMARY
[facility]    PATIENT'S NAME: Lennox Book   ATTENDING PHYSICIAN: Jerel Abdi MD   PATIENT ACCOUNT#:   [id number]    LOCATION:  [address] [place] [address] #:   [id number]       YOB: 1970  ADMISSION DATE:       08/16/2 hypophosphatemia, corrected. 4.   Hypokalemia, corrected. 5.   Acute renal failure on perhaps chronic kidney disease stage III. The patient's creatinine was 2.30 on admission, now down to 1. 12. Would follow up. CONDITION ON DISCHARGE:  Stable.

## 2021-08-24 ENCOUNTER — PATIENT OUTREACH (OUTPATIENT)
Dept: CASE MANAGEMENT | Age: 51
End: 2021-08-24

## 2021-08-25 NOTE — PAYOR COMM NOTE
--------------  DISCHARGE REVIEW    Payor: [address] [place] [address] #:  [id number]  Authorization Number: PENDING     Admit date: 8/16/21  Admit time:   5:45 PM  Discharge Date: 8/18/2021  2:44 PM     Admitting Physician: Parveen Awan MD  Attendi friendly, and cooperative. LABORATORY STUDIES:  Please see chart. ASSESSMENT AND PLAN:    1. Diabetic ketoacidosis, possible gastroparesis versus lack of insulin for type 1 diabetes. The patient is doing better.   We will see how he does on Levem recommended. Dictated By Franklin Morris.  MD Trinidad  d: 08/18/2021 17:20:57  t: 08/20/2021 15:42:31  Job [id number]  LAS/    Electronically signed by Dale Hinton MD on 8/20/2021  4:15 PM         REVIEWER COMMENTS

## 2022-09-24 ENCOUNTER — APPOINTMENT (OUTPATIENT)
Dept: GENERAL RADIOLOGY | Facility: HOSPITAL | Age: 52
End: 2022-09-24
Attending: EMERGENCY MEDICINE

## 2022-09-24 ENCOUNTER — HOSPITAL ENCOUNTER (EMERGENCY)
Facility: HOSPITAL | Age: 52
Discharge: HOME | End: 2022-09-24
Attending: EMERGENCY MEDICINE

## 2022-09-24 VITALS
SYSTOLIC BLOOD PRESSURE: 129 MMHG | DIASTOLIC BLOOD PRESSURE: 84 MMHG | TEMPERATURE: 97 F | HEART RATE: 93 BPM | OXYGEN SATURATION: 98 % | RESPIRATION RATE: 26 BRPM

## 2022-09-24 DIAGNOSIS — T18.5XXA FOREIGN BODY IN ANUS AND RECTUM, INITIAL ENCOUNTER: Primary | ICD-10-CM

## 2022-09-24 PROCEDURE — 96374 THER/PROPH/DIAG INJ IV PUSH: CPT

## 2022-09-24 PROCEDURE — 96376 TX/PRO/DX INJ SAME DRUG ADON: CPT

## 2022-09-24 PROCEDURE — 96361 HYDRATE IV INFUSION ADD-ON: CPT

## 2022-09-24 PROCEDURE — 74018 RADEX ABDOMEN 1 VIEW: CPT | Performed by: EMERGENCY MEDICINE

## 2022-09-24 PROCEDURE — 99284 EMERGENCY DEPT VISIT MOD MDM: CPT

## 2022-09-24 RX ORDER — MIDAZOLAM HYDROCHLORIDE 10 MG/2ML
5 INJECTION, SOLUTION INTRAMUSCULAR; INTRAVENOUS ONCE
Status: COMPLETED | OUTPATIENT
Start: 2022-09-24 | End: 2022-09-24

## 2022-09-24 RX ORDER — MIDAZOLAM HYDROCHLORIDE 10 MG/2ML
INJECTION, SOLUTION INTRAMUSCULAR; INTRAVENOUS
Status: COMPLETED
Start: 2022-09-24 | End: 2022-09-24
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
## (undated) DEVICE — Device: Brand: DEFENDO AIR/WATER/SUCTION AND BIOPSY VALVE

## (undated) DEVICE — ENDOSCOPY PACK UPPER: Brand: MEDLINE INDUSTRIES, INC.

## (undated) DEVICE — FORCEP RADIAL JAW 4

## (undated) NOTE — ED AVS SNAPSHOT
Laura Sexton   MRN: [id number]    Department:  [facility] Emergency Department   Date of Visit:  6/26/2018           Disclosure     Insurance plans vary and the physician(s) referred by the ER may not be covered by your plan.  Please contact y CARE PHYSICIAN AT ONCE OR RETURN IMMEDIATELY TO THE EMERGENCY DEPARTMENT. If you have been prescribed any medication(s), please fill your prescription right away and begin taking the medication(s) as directed.   If you believe that any of the medications

## (undated) NOTE — LETTER
[address], [place]  Authorization for Invasive Procedures  1.  I hereby authorize Dr. Steffi Veloz** , my physician and whomever may be designated as the doctor's assistant, to perform the following operation and/or procedure: esophagog performed for the purposes of advancing medicine, science, and/or education, provided my identity is not revealed. If the procedure has been videotaped, the physician/surgeon will obtain the original videotape.  The hospital will not be responsible for stor My signature below affirms that prior to the time of the procedure, I have explained to the patient and/or his legal representative, the risks and benefits involved in the proposed treatment and any reasonable alternative to the proposed treatment.  I have

## (undated) NOTE — LETTER
[facility]  Administration of Anesthesia  1. I, Feli Mata, or _________________________________ acting on his/her behalf, (Patient) (Dependent/Representative) request to  receive anesthesia for my pending procedure/operation/treatment infections, high spinal block, spinal bleeding, seizure, cardiac arrest and death. 7. AWARENESS: I understand that it is possible (but unlikely) to have explicit memory of events from the operating room while under general anesthesia.   8. ELECTROCONVULSIV (Responsible person in case of minor/ unconscious pt) /Relationship    My signature below affirms that prior to the time of the procedure, I have explained to the patient and/or his/her guardian, the risks and benefits o